# Patient Record
Sex: FEMALE | Race: WHITE | NOT HISPANIC OR LATINO | Employment: FULL TIME | URBAN - METROPOLITAN AREA
[De-identification: names, ages, dates, MRNs, and addresses within clinical notes are randomized per-mention and may not be internally consistent; named-entity substitution may affect disease eponyms.]

---

## 2024-09-10 ENCOUNTER — VIRTUAL VISIT (OUTPATIENT)
Dept: NEUROSURGERY | Facility: CLINIC | Age: 44
End: 2024-09-10
Payer: COMMERCIAL

## 2024-09-10 ENCOUNTER — PRE VISIT (OUTPATIENT)
Dept: NEUROSURGERY | Facility: CLINIC | Age: 44
End: 2024-09-10

## 2024-09-10 VITALS — BODY MASS INDEX: 24.49 KG/M2 | HEIGHT: 65 IN | WEIGHT: 147 LBS

## 2024-09-10 DIAGNOSIS — D32.0 BENIGN NEOPLASM OF CEREBRAL MENINGES (H): Primary | ICD-10-CM

## 2024-09-10 PROCEDURE — 99204 OFFICE O/P NEW MOD 45 MIN: CPT | Mod: 95 | Performed by: NEUROLOGICAL SURGERY

## 2024-09-10 RX ORDER — LEVETIRACETAM 1000 MG/1
1000 TABLET ORAL 2 TIMES DAILY
COMMUNITY
Start: 2024-09-10

## 2024-09-10 RX ORDER — DEXAMETHASONE 4 MG/1
4 TABLET ORAL 3 TIMES DAILY
Status: ON HOLD | COMMUNITY
Start: 2024-09-10 | End: 2024-09-18

## 2024-09-10 RX ORDER — OMEPRAZOLE 40 MG/1
40 CAPSULE, DELAYED RELEASE ORAL DAILY
COMMUNITY
Start: 2024-09-10

## 2024-09-10 ASSESSMENT — PAIN SCALES - GENERAL: PAINLEVEL: NO PAIN (0)

## 2024-09-10 NOTE — PROGRESS NOTES
"Virtual Visit Details  Date of service: 9/10/2024  Length of service: 45 minutes  Type of service:  Telephone Visit     Originating Location (pt. Location): Home  Distant Location (provider location):  On-site      Shelbi Bermudez MD   [address] [place] [id number]    Dear Dr. Bermudez:    We spoke to Mrs. Wilson as part of a telephone visit in cranial neurosurgery clinic today.  We discussed her case extensively with our colleague, Dr. Melvin, and reviewed her records from [facility].  She was accompanied by her fiancé.  In summary, she is a 44-year-old right-handed woman who experienced a first time seizure yesterday.  Below is the excerpt from the emergency department evaluation:    \"Lanette Kaufman is a 44-year-old woman who comes in for evaluation because of concern for seizure today. She states that she is been feeling well recently. She has not been have any headaches or nausea or vomiting or visual changes. She has not noticed any weakness. She does not really recall all of the events of today, but remembers being on the phone with her grandma. She states that she recalls having trouble understanding her grandmother, but on further discussion it sounds like she understood her grandmother was saying but could not get her words out. She was having trouble understanding how to respond to things.    Boyfriend tells me that she was having some trouble communicating with him briefly as well around this same time. He went out to the garage briefly and came back in less than 5 minutes later and saw her on the sofa. She was not actively shaking, but he noted that her muscle tone was tense and she was not breathing normally. She was noted to be foaming at the mouth. She did not urinate on herself. He rolled her over on her side and this lasted a few minutes before completely resolving. It was on the order of 2 or 3 minutes before she was opening her eyes, and less than 10 minutes before " "EMS got the call and arrived, at which point she was able to verbalize her boyfriend's name when he asked.\"    She felt back to normal by yesterday afternoon.  She had some headaches over the weekend and she has a longstanding history of more severe headaches which she labeled as migraines.  She was started on dexamethasone 4 mg 3 times daily and Keppra 1000 mg twice daily and omeprazole.  She denies any problems with speech, language, memory, or performance at work.    Her past medical history includes appendectomy in ,  x 2, lymph node biopsy in , and uterine fibroids.    Family history includes breast cancer and stroke in her mother and leukemia in her maternal grandfather    She lives in [place], [place].  She is engaged and has 2 daughters.  She works as a  at [employer].  She does not smoke and drinks alcohol socially several times a week.    Over the phone, she sounded well.  Her speech, language, and phonation were normal.    We had limited views of the MRI done yesterday, 9/10/2024.  There is a moderate-sized (2.5 cm) homogeneously enhancing tumor arising from the anterior fossa over the left orbital roof and projecting into the left basal frontal lobe.  There is marked edema throughout the left prefrontal region.  Imaging characteristics are consistent with meningioma.    Body CT scanning was also done and no masses other than the uterine fibroids were seen.    We agree with Dr. Melvin that resection of this tumor is only reasonable treatment option.  Since she is feeling well currently, surgery can be done electively, within the next 1 to 2 weeks.  Since she is on a moderate dose of dexamethasone, we agree that surgery should not be delayed for a long time.  We will obtain the remainder of her imaging studies.    Ms. Kaufman was already prepared for surgery.  We reviewed the details of the left frontal craniotomy and tumor resection including the risks of death, " brain injury, infection, hemorrhage, stroke, CSF leak, need for reoperation and she agrees to proceed.  We also went over the expected hospital course and postoperative recovery.  We will plan on surgery sometime in the next 1 to 2 weeks.  From a social standpoint, the sooner she recovers from surgery, the sooner she will be ready for her wedding in January.  We will keep you informed of her progress.  Please do not hesitate to contact us with questions.    Sincerely,        Valentin Desai MD  Department of Neurosurgery

## 2024-09-10 NOTE — LETTER
"9/10/2024       RE: Lanette Wilson  [address]  [facility] [address]     Dear Colleague,    Thank you for referring your patient, Lanette Wilson, to the [facility] NEUROSURGERY [facility] [place] at [facility]. Please see a copy of my visit note below.    Virtual Visit Details  Date of service: 9/10/2024  Length of service: 45 minutes  Type of service:  Telephone Visit     Originating Location (pt. Location): Home  Distant Location (provider location):  On-site      Shelbi Bermudez MD   [address] [place] [id number]    Dear Dr. Bermudez:    We spoke to Mrs. Wilson as part of a telephone visit in cranial neurosurgery clinic today.  We discussed her case extensively with our colleague, Dr. Melvin, and reviewed her records from [facility].  She was accompanied by her fiancé.  In summary, she is a 44-year-old right-handed woman who experienced a first time seizure yesterday.  Below is the excerpt from the emergency department evaluation:    \"Lanette Kaufman is a 44-year-old woman who comes in for evaluation because of concern for seizure today. She states that she is been feeling well recently. She has not been have any headaches or nausea or vomiting or visual changes. She has not noticed any weakness. She does not really recall all of the events of today, but remembers being on the phone with her grandma. She states that she recalls having trouble understanding her grandmother, but on further discussion it sounds like she understood her grandmother was saying but could not get her words out. She was having trouble understanding how to respond to things.    Boyfriend tells me that she was having some trouble communicating with him briefly as well around this same time. He went out to the garage briefly and came back in less than 5 minutes later and saw her on the sofa. She was not actively shaking, but he noted that her " "muscle tone was tense and she was not breathing normally. She was noted to be foaming at the mouth. She did not urinate on herself. He rolled her over on her side and this lasted a few minutes before completely resolving. It was on the order of 2 or 3 minutes before she was opening her eyes, and less than 10 minutes before EMS got the call and arrived, at which point she was able to verbalize her boyfriend's name when he asked.\"    She felt back to normal by yesterday afternoon.  She had some headaches over the weekend and she has a longstanding history of more severe headaches which she labeled as migraines.  She was started on dexamethasone 4 mg 3 times daily and Keppra 1000 mg twice daily and omeprazole.  She denies any problems with speech, language, memory, or performance at work.    Her past medical history includes appendectomy in ,  x 2, lymph node biopsy in , and uterine fibroids.    Family history includes breast cancer and stroke in her mother and leukemia in her maternal grandfather    She lives in [place], [place].  She is engaged and has 2 daughters.  She works as a  at [email or web address].  She does not smoke and drinks alcohol socially several times a week.    Over the phone, she sounded well.  Her speech, language, and phonation were normal.    We had limited views of the MRI done yesterday, 9/10/2024.  There is a moderate-sized (2.5 cm) homogeneously enhancing tumor arising from the anterior fossa over the left orbital roof and projecting into the left basal frontal lobe.  There is marked edema throughout the left prefrontal region.  Imaging characteristics are consistent with meningioma.    Body CT scanning was also done and no masses other than the uterine fibroids were seen.    We agree with Dr. Melvin that resection of this tumor is only reasonable treatment option.  Since she is feeling well currently, surgery can be done electively, within the next 1 to 2 weeks.  " Since she is on a moderate dose of dexamethasone, we agree that surgery should not be delayed for a long time.  We will obtain the remainder of her imaging studies.    Ms. Kaufman was already prepared for surgery.  We reviewed the details of the left frontal craniotomy and tumor resection including the risks of death, brain injury, infection, hemorrhage, stroke, CSF leak, need for reoperation and she agrees to proceed.  We also went over the expected hospital course and postoperative recovery.  We will plan on surgery sometime in the next 1 to 2 weeks.  From a social standpoint, the sooner she recovers from surgery, the sooner she will be ready for her wedding in January.  We will keep you informed of her progress.  Please do not hesitate to contact us with questions.    Sincerely,        Valentin Desai MD  Department of Neurosurgery            Again, thank you for allowing me to participate in the care of your patient.      Sincerely,    Valnetin Desai MD

## 2024-09-10 NOTE — TELEPHONE ENCOUNTER
** Marked for Merge w/ MRN [id number]  Lanette Carter     Records Requested     September 10, 2024 3:46 PM   [id number]   Facility  [facility]   Outcome 3:49 pm Sent request for imaging to be pushed to PACS.-JA     RECORDS RECEIVED FROM: Care Everywhere   REASON FOR VISIT: Brain Mass   PROVIDER: Valentin Desai MD   DATE OF APPT: 9/10/24 @ 3:30 pm    NOTES (FOR ALL VISITS) STATUS DETAILS   DISCHARGE REPORT from the ER Care Everywhere 9/9/24-9/10/24 Faiza Escobedo MD  @[facility] ED     MEDICATION LIST Care Everywhere    IMAGING  (FOR ALL VISITS)     MRI (HEAD, NECK, SPINE) [facility]  9/10/24 MR Brain

## 2024-09-10 NOTE — NURSING NOTE
Current patient location: [address] [place] [id number]    Is the patient currently in the state of [place]? NO    Visit mode:VIDEO    If the visit is dropped, the patient can be reconnected by: VIDEO VISIT: Text to cell phone:   No relevant phone numbers on file.       Will anyone else be joining the visit? NO  (If patient encounters technical issues they should call [phone])    How would you like to obtain your AVS? Mail a copy    Are changes needed to the allergy or medication list? No    Are refills needed on medications prescribed by this physician? NO    Rooming Documentation:  Not applicable      Reason for visit: Consult    Melanie WAITE

## 2024-09-13 ENCOUNTER — TELEPHONE (OUTPATIENT)
Dept: NEUROSURGERY | Facility: CLINIC | Age: 44
End: 2024-09-13
Payer: COMMERCIAL

## 2024-09-13 RX ORDER — CEFAZOLIN SODIUM/WATER 2 G/20 ML
2 SYRINGE (ML) INTRAVENOUS
Status: CANCELLED | OUTPATIENT
Start: 2024-09-13

## 2024-09-13 RX ORDER — CEFAZOLIN SODIUM/WATER 2 G/20 ML
2 SYRINGE (ML) INTRAVENOUS SEE ADMIN INSTRUCTIONS
Status: CANCELLED | OUTPATIENT
Start: 2024-09-13

## 2024-09-13 NOTE — TELEPHONE ENCOUNTER
Spoke with patient, states pre admission has just talked with patient reviewing date/time location of procedure, showering information and when to stop eating/drinking fluids.  Patient states all questions answered ready for Procedure Arrival time 12:00 PM Monday 9/16 @ [address] [address].    Voices understanding and has my name and number if needed.

## 2024-09-13 NOTE — TELEPHONE ENCOUNTER
Called patient to schedule surgery with Dr. Freddy Desai    Spoke with:  Lanette (patient)    Date of Surgery: 9/16/2024    Estimated Arrival time Discussed with Patient:  Yes    Location of surgery: [facility]     Pre-Op H&P: Completed with ED visit on 9/9    Post-Op Appts: 10/4/2024 with Beverly CARRILLO     Imaging:  No     Discussed with patient pre-op RN will call 2-3 days prior to surgery with arrival time and instructions:  Yes     Packet sent out: No 09/13/24  via [employer]    Vendor/Rep/Monitoring:   No     Additional Comments:      All patients questions were answered and patient was instructed to review surgical packet and call back with any questions or concerns.       Leanne Carbone on 9/13/2024 at 1:43 PM

## 2024-09-15 ENCOUNTER — ANESTHESIA EVENT (OUTPATIENT)
Dept: SURGERY | Facility: CLINIC | Age: 44
End: 2024-09-15
Payer: COMMERCIAL

## 2024-09-15 NOTE — ANESTHESIA PREPROCEDURE EVALUATION
"Anesthesia Pre-Procedure Evaluation    Patient: Lanette Wilson   MRN: [id number] : 1980        Procedure : Procedure(s):  Left frontal craniotomy and resection of tumor          No past medical history on file.   No past surgical history on file.   Allergies   Allergen Reactions    Cats      Sneezing and runny nose    Dogs      Runny nose and watery eye      Social History     Tobacco Use    Smoking status: Never    Smokeless tobacco: Never   Substance Use Topics    Alcohol use: Not on file     Comment: 3 drinks a day 21 a week      Wt Readings from Last 1 Encounters:   09/10/24 66.7 kg (147 lb)        Anesthesia Evaluation   Pt has had prior anesthetic. Type: General and Regional.    No history of anesthetic complications       ROS/MED HX  ENT/Pulmonary:  - neg pulmonary ROS     Neurologic: Comment: Meningioma, discovered after a seizure. On decadron 4mg TID and keppra 1000mg BID    (+)       seizures,                         Cardiovascular:  - neg cardiovascular ROS     METS/Exercise Tolerance:     Hematologic:  - neg hematologic  ROS     Musculoskeletal:  - neg musculoskeletal ROS     GI/Hepatic:  - neg GI/hepatic ROS  (-) GERD   Renal/Genitourinary:  - neg Renal ROS     Endo:  - neg endo ROS     Psychiatric/Substance Use:  - neg psychiatric ROS     Infectious Disease:  - neg infectious disease ROS     Malignancy:       Other:            Physical Exam    Airway        Mallampati: III   TM distance: > 3 FB   Neck ROM: full   Mouth opening: < 3 cm    Respiratory Devices and Support         Dental       (+) Minor Abnormalities - some fillings, tiny chips      Cardiovascular   cardiovascular exam normal          Pulmonary   pulmonary exam normal                OUTSIDE LABS:  CBC: No results found for: \"WBC\", \"HGB\", \"HCT\", \"PLT\"  BMP: No results found for: \"NA\", \"POTASSIUM\", \"CHLORIDE\", \"CO2\", \"BUN\", \"CR\", \"GLC\"  COAGS: No results found for: \"PTT\", \"INR\", \"FIBR\"  POC: No results found for: \"BGM\", \"HCG\", " "\"HCGS\"  HEPATIC: No results found for: \"ALBUMIN\", \"PROTTOTAL\", \"ALT\", \"AST\", \"GGT\", \"ALKPHOS\", \"BILITOTAL\", \"BILIDIRECT\", \"JORGE\"  OTHER: No results found for: \"PH\", \"LACT\", \"A1C\", \"CHI\", \"PHOS\", \"MAG\", \"LIPASE\", \"AMYLASE\", \"TSH\", \"T4\", \"T3\", \"CRP\", \"SED\"    Anesthesia Plan    ASA Status:  2    NPO Status:  NPO Appropriate    Anesthesia Type: General.     - Airway: ETT   Induction: Intravenous, Propofol.   Maintenance: Balanced.   Techniques and Equipment:     - Lines/Monitors: 2nd IV, Arterial Line     Consents    Anesthesia Plan(s) and associated risks, benefits, and realistic alternatives discussed. Questions answered and patient/representative(s) expressed understanding.     - Discussed:     - Discussed with:  Patient      - Extended Intubation/Ventilatory Support Discussed: No.      - Patient is DNR/DNI Status: No     Use of blood products discussed: Yes.     - Discussed with: Patient.     - Consented: consented to blood products     Postoperative Care    Pain management: Multi-modal analgesia, IV analgesics.   PONV prophylaxis: Ondansetron (or other 5HT-3), Dexamethasone or Solumedrol     Comments:               Hilario Ovalle MD    I have reviewed the pertinent notes and labs in the chart from the past 30 days and (re)examined the patient.  Any updates or changes from those notes are reflected in this note.                  "

## 2024-09-16 ENCOUNTER — ANESTHESIA (OUTPATIENT)
Dept: SURGERY | Facility: CLINIC | Age: 44
End: 2024-09-16
Payer: COMMERCIAL

## 2024-09-16 ENCOUNTER — APPOINTMENT (OUTPATIENT)
Dept: CT IMAGING | Facility: CLINIC | Age: 44
End: 2024-09-16
Payer: COMMERCIAL

## 2024-09-16 ENCOUNTER — HOSPITAL ENCOUNTER (INPATIENT)
Facility: CLINIC | Age: 44
LOS: 2 days | Discharge: HOME | End: 2024-09-18
Attending: NEUROLOGICAL SURGERY | Admitting: NEUROLOGICAL SURGERY
Payer: COMMERCIAL

## 2024-09-16 DIAGNOSIS — G93.89 FRONTAL MASS OF BRAIN: Primary | ICD-10-CM

## 2024-09-16 LAB
ABO/RH(D): NORMAL
ANION GAP SERPL CALCULATED.3IONS-SCNC: 14 MMOL/L (ref 7–15)
ANTIBODY SCREEN: NEGATIVE
APTT PPP: <20 SECONDS (ref 22–38)
BASE EXCESS BLDA CALC-SCNC: 1.3 MMOL/L (ref -3–3)
BASE EXCESS BLDA CALC-SCNC: 1.8 MMOL/L (ref -3–3)
BASE EXCESS BLDA CALC-SCNC: 2 MMOL/L (ref -3–3)
BUN SERPL-MCNC: 15.2 MG/DL (ref 6–20)
CA-I BLD-MCNC: 4.4 MG/DL (ref 4.4–5.2)
CA-I BLD-MCNC: 4.5 MG/DL (ref 4.4–5.2)
CA-I BLD-MCNC: 4.5 MG/DL (ref 4.4–5.2)
CALCIUM SERPL-MCNC: 9.4 MG/DL (ref 8.8–10.4)
CHLORIDE SERPL-SCNC: 99 MMOL/L (ref 98–107)
CREAT SERPL-MCNC: 0.62 MG/DL (ref 0.51–0.95)
EGFRCR SERPLBLD CKD-EPI 2021: >90 ML/MIN/1.73M2
ERYTHROCYTE [DISTWIDTH] IN BLOOD BY AUTOMATED COUNT: 14.1 % (ref 10–15)
GLUCOSE BLD-MCNC: 125 MG/DL (ref 70–99)
GLUCOSE BLD-MCNC: 126 MG/DL (ref 70–99)
GLUCOSE BLD-MCNC: 165 MG/DL (ref 70–99)
GLUCOSE BLDC GLUCOMTR-MCNC: 127 MG/DL (ref 70–99)
GLUCOSE BLDC GLUCOMTR-MCNC: 130 MG/DL (ref 70–99)
GLUCOSE SERPL-MCNC: 109 MG/DL (ref 70–99)
HCO3 BLDA-SCNC: 25 MMOL/L (ref 21–28)
HCO3 BLDA-SCNC: 25 MMOL/L (ref 21–28)
HCO3 BLDA-SCNC: 26 MMOL/L (ref 21–28)
HCO3 SERPL-SCNC: 24 MMOL/L (ref 22–29)
HCT VFR BLD AUTO: 40.8 % (ref 35–47)
HGB BLD-MCNC: 11.4 G/DL (ref 11.7–15.7)
HGB BLD-MCNC: 11.8 G/DL (ref 11.7–15.7)
HGB BLD-MCNC: 12.2 G/DL (ref 11.7–15.7)
HGB BLD-MCNC: 13.2 G/DL (ref 11.7–15.7)
INR PPP: 0.97 (ref 0.85–1.15)
LACTATE BLD-SCNC: 2.2 MMOL/L (ref 0.7–2)
LACTATE BLD-SCNC: 3.6 MMOL/L (ref 0.7–2)
LACTATE BLD-SCNC: 4.5 MMOL/L (ref 0.7–2)
MCH RBC QN AUTO: 26.4 PG (ref 26.5–33)
MCHC RBC AUTO-ENTMCNC: 32.4 G/DL (ref 31.5–36.5)
MCV RBC AUTO: 82 FL (ref 78–100)
O2/TOTAL GAS SETTING VFR VENT: 50 %
OXYHGB MFR BLDA: 98 % (ref 92–100)
OXYHGB MFR BLDA: 99 % (ref 92–100)
OXYHGB MFR BLDA: 99 % (ref 92–100)
PCO2 BLDA: 33 MM HG (ref 35–45)
PCO2 BLDA: 35 MM HG (ref 35–45)
PCO2 BLDA: 37 MM HG (ref 35–45)
PH BLDA: 7.46 [PH] (ref 7.35–7.45)
PH BLDA: 7.46 [PH] (ref 7.35–7.45)
PH BLDA: 7.49 [PH] (ref 7.35–7.45)
PLATELET # BLD AUTO: 216 10E3/UL (ref 150–450)
PO2 BLDA: 171 MM HG (ref 80–105)
PO2 BLDA: 193 MM HG (ref 80–105)
PO2 BLDA: 203 MM HG (ref 80–105)
POTASSIUM BLD-SCNC: 3.2 MMOL/L (ref 3.4–5.3)
POTASSIUM BLD-SCNC: 3.7 MMOL/L (ref 3.4–5.3)
POTASSIUM BLD-SCNC: 3.7 MMOL/L (ref 3.4–5.3)
POTASSIUM SERPL-SCNC: 3.5 MMOL/L (ref 3.4–5.3)
RBC # BLD AUTO: 5 10E6/UL (ref 3.8–5.2)
SAO2 % BLDA: 100 % (ref 96–97)
SODIUM BLD-SCNC: 133 MMOL/L (ref 135–145)
SODIUM BLD-SCNC: 135 MMOL/L (ref 135–145)
SODIUM BLD-SCNC: 137 MMOL/L (ref 135–145)
SODIUM SERPL-SCNC: 137 MMOL/L (ref 135–145)
SPECIMEN EXPIRATION DATE: NORMAL
WBC # BLD AUTO: 11.1 10E3/UL (ref 4–11)

## 2024-09-16 PROCEDURE — 88342 IMHCHEM/IMCYTCHM 1ST ANTB: CPT | Mod: 26 | Performed by: STUDENT IN AN ORGANIZED HEALTH CARE EDUCATION/TRAINING PROGRAM

## 2024-09-16 PROCEDURE — 69990 MICROSURGERY ADD-ON: CPT | Mod: GC | Performed by: NEUROLOGICAL SURGERY

## 2024-09-16 PROCEDURE — 84295 ASSAY OF SERUM SODIUM: CPT

## 2024-09-16 PROCEDURE — 61584 ORBITOCRANIAL APPROACH/SKULL: CPT | Mod: GC | Performed by: NEUROLOGICAL SURGERY

## 2024-09-16 PROCEDURE — 85730 THROMBOPLASTIN TIME PARTIAL: CPT | Performed by: STUDENT IN AN ORGANIZED HEALTH CARE EDUCATION/TRAINING PROGRAM

## 2024-09-16 PROCEDURE — 200N000002 HC R&B ICU UMMC

## 2024-09-16 PROCEDURE — 250N000011 HC RX IP 250 OP 636

## 2024-09-16 PROCEDURE — 250N000011 HC RX IP 250 OP 636: Performed by: ANESTHESIOLOGY

## 2024-09-16 PROCEDURE — 250N000009 HC RX 250: Performed by: ANESTHESIOLOGY

## 2024-09-16 PROCEDURE — 80048 BASIC METABOLIC PNL TOTAL CA: CPT | Performed by: STUDENT IN AN ORGANIZED HEALTH CARE EDUCATION/TRAINING PROGRAM

## 2024-09-16 PROCEDURE — 61510 CRNEC TREPH EXC BRN TUM STTL: CPT | Performed by: ANESTHESIOLOGY

## 2024-09-16 PROCEDURE — 61510 CRNEC TREPH EXC BRN TUM STTL: CPT | Performed by: NURSE ANESTHETIST, CERTIFIED REGISTERED

## 2024-09-16 PROCEDURE — 36415 COLL VENOUS BLD VENIPUNCTURE: CPT | Performed by: STUDENT IN AN ORGANIZED HEALTH CARE EDUCATION/TRAINING PROGRAM

## 2024-09-16 PROCEDURE — 250N000025 HC SEVOFLURANE, PER MIN: Performed by: NEUROLOGICAL SURGERY

## 2024-09-16 PROCEDURE — 710N000010 HC RECOVERY PHASE 1, LEVEL 2, PER MIN: Performed by: NEUROLOGICAL SURGERY

## 2024-09-16 PROCEDURE — 70450 CT HEAD/BRAIN W/O DYE: CPT | Mod: 26 | Performed by: STUDENT IN AN ORGANIZED HEALTH CARE EDUCATION/TRAINING PROGRAM

## 2024-09-16 PROCEDURE — 250N000009 HC RX 250: Performed by: NURSE ANESTHETIST, CERTIFIED REGISTERED

## 2024-09-16 PROCEDURE — 250N000011 HC RX IP 250 OP 636: Performed by: NURSE ANESTHETIST, CERTIFIED REGISTERED

## 2024-09-16 PROCEDURE — 86900 BLOOD TYPING SEROLOGIC ABO: CPT | Performed by: STUDENT IN AN ORGANIZED HEALTH CARE EDUCATION/TRAINING PROGRAM

## 2024-09-16 PROCEDURE — 88341 IMHCHEM/IMCYTCHM EA ADD ANTB: CPT | Mod: 26 | Performed by: STUDENT IN AN ORGANIZED HEALTH CARE EDUCATION/TRAINING PROGRAM

## 2024-09-16 PROCEDURE — 999N000141 HC STATISTIC PRE-PROCEDURE NURSING ASSESSMENT: Performed by: NEUROLOGICAL SURGERY

## 2024-09-16 PROCEDURE — 250N000013 HC RX MED GY IP 250 OP 250 PS 637

## 2024-09-16 PROCEDURE — 250N000011 HC RX IP 250 OP 636: Performed by: STUDENT IN AN ORGANIZED HEALTH CARE EDUCATION/TRAINING PROGRAM

## 2024-09-16 PROCEDURE — 70450 CT HEAD/BRAIN W/O DYE: CPT

## 2024-09-16 PROCEDURE — 85610 PROTHROMBIN TIME: CPT | Performed by: STUDENT IN AN ORGANIZED HEALTH CARE EDUCATION/TRAINING PROGRAM

## 2024-09-16 PROCEDURE — 00D70ZZ EXTRACTION OF CEREBRAL HEMISPHERE, OPEN APPROACH: ICD-10-PCS | Performed by: NEUROLOGICAL SURGERY

## 2024-09-16 PROCEDURE — 360N000079 HC SURGERY LEVEL 6, PER MIN: Performed by: NEUROLOGICAL SURGERY

## 2024-09-16 PROCEDURE — 61601 RESECT/EXCISE CRANIAL LESION: CPT | Mod: 51 | Performed by: NEUROLOGICAL SURGERY

## 2024-09-16 PROCEDURE — 00B10ZZ EXCISION OF CEREBRAL MENINGES, OPEN APPROACH: ICD-10-PCS | Performed by: NEUROLOGICAL SURGERY

## 2024-09-16 PROCEDURE — 88342 IMHCHEM/IMCYTCHM 1ST ANTB: CPT | Mod: TC | Performed by: NEUROLOGICAL SURGERY

## 2024-09-16 PROCEDURE — 370N000017 HC ANESTHESIA TECHNICAL FEE, PER MIN: Performed by: NEUROLOGICAL SURGERY

## 2024-09-16 PROCEDURE — 258N000003 HC RX IP 258 OP 636: Performed by: STUDENT IN AN ORGANIZED HEALTH CARE EDUCATION/TRAINING PROGRAM

## 2024-09-16 PROCEDURE — 250N000013 HC RX MED GY IP 250 OP 250 PS 637: Performed by: STUDENT IN AN ORGANIZED HEALTH CARE EDUCATION/TRAINING PROGRAM

## 2024-09-16 PROCEDURE — 250N000009 HC RX 250: Performed by: NEUROLOGICAL SURGERY

## 2024-09-16 PROCEDURE — 82330 ASSAY OF CALCIUM: CPT

## 2024-09-16 PROCEDURE — C1763 CONN TISS, NON-HUMAN: HCPCS | Performed by: NEUROLOGICAL SURGERY

## 2024-09-16 PROCEDURE — 258N000003 HC RX IP 258 OP 636: Performed by: NURSE ANESTHETIST, CERTIFIED REGISTERED

## 2024-09-16 PROCEDURE — 272N000001 HC OR GENERAL SUPPLY STERILE: Performed by: NEUROLOGICAL SURGERY

## 2024-09-16 PROCEDURE — 250N000011 HC RX IP 250 OP 636: Performed by: NEUROLOGICAL SURGERY

## 2024-09-16 PROCEDURE — 88307 TISSUE EXAM BY PATHOLOGIST: CPT | Mod: 26 | Performed by: STUDENT IN AN ORGANIZED HEALTH CARE EDUCATION/TRAINING PROGRAM

## 2024-09-16 PROCEDURE — C1713 ANCHOR/SCREW BN/BN,TIS/BN: HCPCS | Performed by: NEUROLOGICAL SURGERY

## 2024-09-16 PROCEDURE — 258N000003 HC RX IP 258 OP 636

## 2024-09-16 PROCEDURE — 85014 HEMATOCRIT: CPT | Performed by: STUDENT IN AN ORGANIZED HEALTH CARE EDUCATION/TRAINING PROGRAM

## 2024-09-16 DEVICE — IMP PLATE SYN BURR HOLE COVER 17MM 04.503.023: Type: IMPLANTABLE DEVICE | Site: CRANIAL | Status: FUNCTIONAL

## 2024-09-16 DEVICE — IMP PLATE SYN BOX 4 HOLE 14X14MM 04.503.065: Type: IMPLANTABLE DEVICE | Site: CRANIAL | Status: FUNCTIONAL

## 2024-09-16 DEVICE — GRAFT DURAGEN 2X2" ID220: Type: IMPLANTABLE DEVICE | Site: CRANIAL | Status: FUNCTIONAL

## 2024-09-16 DEVICE — IMP PLATE SYN MATRIXNEURO STR 2 HOLE 12MM  04.503.062: Type: IMPLANTABLE DEVICE | Site: CRANIAL | Status: FUNCTIONAL

## 2024-09-16 RX ORDER — NALOXONE HYDROCHLORIDE 0.4 MG/ML
0.1 INJECTION, SOLUTION INTRAMUSCULAR; INTRAVENOUS; SUBCUTANEOUS
Status: DISCONTINUED | OUTPATIENT
Start: 2024-09-16 | End: 2024-09-16 | Stop reason: HOSPADM

## 2024-09-16 RX ORDER — ONDANSETRON 4 MG/1
4 TABLET, ORALLY DISINTEGRATING ORAL EVERY 6 HOURS PRN
Status: DISCONTINUED | OUTPATIENT
Start: 2024-09-16 | End: 2024-09-18 | Stop reason: HOSPADM

## 2024-09-16 RX ORDER — LABETALOL HYDROCHLORIDE 5 MG/ML
10 INJECTION, SOLUTION INTRAVENOUS
Status: DISCONTINUED | OUTPATIENT
Start: 2024-09-16 | End: 2024-09-16 | Stop reason: HOSPADM

## 2024-09-16 RX ORDER — DEXAMETHASONE 4 MG/1
4 TABLET ORAL EVERY 8 HOURS SCHEDULED
Status: CANCELLED | OUTPATIENT
Start: 2024-09-16

## 2024-09-16 RX ORDER — ACETAMINOPHEN 325 MG/1
975 TABLET ORAL ONCE
Status: COMPLETED | OUTPATIENT
Start: 2024-09-16 | End: 2024-09-16

## 2024-09-16 RX ORDER — PROPOFOL 10 MG/ML
INJECTION, EMULSION INTRAVENOUS PRN
Status: DISCONTINUED | OUTPATIENT
Start: 2024-09-16 | End: 2024-09-16

## 2024-09-16 RX ORDER — ONDANSETRON 2 MG/ML
INJECTION INTRAMUSCULAR; INTRAVENOUS PRN
Status: DISCONTINUED | OUTPATIENT
Start: 2024-09-16 | End: 2024-09-16

## 2024-09-16 RX ORDER — SODIUM CHLORIDE, SODIUM LACTATE, POTASSIUM CHLORIDE, CALCIUM CHLORIDE 600; 310; 30; 20 MG/100ML; MG/100ML; MG/100ML; MG/100ML
INJECTION, SOLUTION INTRAVENOUS CONTINUOUS
Status: DISCONTINUED | OUTPATIENT
Start: 2024-09-16 | End: 2024-09-16 | Stop reason: HOSPADM

## 2024-09-16 RX ORDER — SODIUM CHLORIDE 9 MG/ML
INJECTION, SOLUTION INTRAVENOUS CONTINUOUS
Status: ACTIVE | OUTPATIENT
Start: 2024-09-16 | End: 2024-09-17

## 2024-09-16 RX ORDER — LIDOCAINE HYDROCHLORIDE 20 MG/ML
INJECTION, SOLUTION INFILTRATION; PERINEURAL PRN
Status: DISCONTINUED | OUTPATIENT
Start: 2024-09-16 | End: 2024-09-16

## 2024-09-16 RX ORDER — NALOXONE HYDROCHLORIDE 0.4 MG/ML
0.2 INJECTION, SOLUTION INTRAMUSCULAR; INTRAVENOUS; SUBCUTANEOUS
Status: DISCONTINUED | OUTPATIENT
Start: 2024-09-16 | End: 2024-09-18 | Stop reason: HOSPADM

## 2024-09-16 RX ORDER — POLYETHYLENE GLYCOL 3350 17 G/17G
17 POWDER, FOR SOLUTION ORAL DAILY
Status: DISCONTINUED | OUTPATIENT
Start: 2024-09-17 | End: 2024-09-18

## 2024-09-16 RX ORDER — LEVETIRACETAM 1000 MG/1
1000 TABLET ORAL 2 TIMES DAILY
Status: CANCELLED | OUTPATIENT
Start: 2024-09-16

## 2024-09-16 RX ORDER — OXYCODONE HYDROCHLORIDE 5 MG/1
5 TABLET ORAL EVERY 4 HOURS PRN
Status: DISCONTINUED | OUTPATIENT
Start: 2024-09-16 | End: 2024-09-18 | Stop reason: HOSPADM

## 2024-09-16 RX ORDER — HYDRALAZINE HYDROCHLORIDE 20 MG/ML
10-20 INJECTION INTRAMUSCULAR; INTRAVENOUS EVERY 30 MIN PRN
Status: DISCONTINUED | OUTPATIENT
Start: 2024-09-16 | End: 2024-09-18 | Stop reason: HOSPADM

## 2024-09-16 RX ORDER — MANNITOL 20 G/100ML
INJECTION, SOLUTION INTRAVENOUS PRN
Status: DISCONTINUED | OUTPATIENT
Start: 2024-09-16 | End: 2024-09-16

## 2024-09-16 RX ORDER — HYDROMORPHONE HYDROCHLORIDE 1 MG/ML
0.4 INJECTION, SOLUTION INTRAMUSCULAR; INTRAVENOUS; SUBCUTANEOUS
Status: DISCONTINUED | OUTPATIENT
Start: 2024-09-16 | End: 2024-09-18 | Stop reason: HOSPADM

## 2024-09-16 RX ORDER — CEFAZOLIN SODIUM/WATER 2 G/20 ML
2 SYRINGE (ML) INTRAVENOUS SEE ADMIN INSTRUCTIONS
Status: DISCONTINUED | OUTPATIENT
Start: 2024-09-16 | End: 2024-09-16 | Stop reason: HOSPADM

## 2024-09-16 RX ORDER — DEXAMETHASONE SODIUM PHOSPHATE 4 MG/ML
4 INJECTION, SOLUTION INTRA-ARTICULAR; INTRALESIONAL; INTRAMUSCULAR; INTRAVENOUS; SOFT TISSUE
Status: DISCONTINUED | OUTPATIENT
Start: 2024-09-16 | End: 2024-09-16 | Stop reason: HOSPADM

## 2024-09-16 RX ORDER — NITROGLYCERIN 10 MG/100ML
INJECTION INTRAVENOUS PRN
Status: DISCONTINUED | OUTPATIENT
Start: 2024-09-16 | End: 2024-09-16

## 2024-09-16 RX ORDER — LEVETIRACETAM 15 MG/ML
1500 INJECTION INTRAVASCULAR ONCE
Status: COMPLETED | OUTPATIENT
Start: 2024-09-16 | End: 2024-09-16

## 2024-09-16 RX ORDER — CEFAZOLIN SODIUM/WATER 2 G/20 ML
2 SYRINGE (ML) INTRAVENOUS
Status: COMPLETED | OUTPATIENT
Start: 2024-09-16 | End: 2024-09-16

## 2024-09-16 RX ORDER — NALOXONE HYDROCHLORIDE 0.4 MG/ML
0.4 INJECTION, SOLUTION INTRAMUSCULAR; INTRAVENOUS; SUBCUTANEOUS
Status: DISCONTINUED | OUTPATIENT
Start: 2024-09-16 | End: 2024-09-18 | Stop reason: HOSPADM

## 2024-09-16 RX ORDER — FENTANYL CITRATE 50 UG/ML
25 INJECTION, SOLUTION INTRAMUSCULAR; INTRAVENOUS EVERY 5 MIN PRN
Status: DISCONTINUED | OUTPATIENT
Start: 2024-09-16 | End: 2024-09-16 | Stop reason: HOSPADM

## 2024-09-16 RX ORDER — FENTANYL CITRATE 50 UG/ML
50 INJECTION, SOLUTION INTRAMUSCULAR; INTRAVENOUS EVERY 5 MIN PRN
Status: DISCONTINUED | OUTPATIENT
Start: 2024-09-16 | End: 2024-09-16 | Stop reason: HOSPADM

## 2024-09-16 RX ORDER — PROPOFOL 10 MG/ML
INJECTION, EMULSION INTRAVENOUS CONTINUOUS PRN
Status: DISCONTINUED | OUTPATIENT
Start: 2024-09-16 | End: 2024-09-16

## 2024-09-16 RX ORDER — BISACODYL 10 MG
10 SUPPOSITORY, RECTAL RECTAL DAILY PRN
Status: DISCONTINUED | OUTPATIENT
Start: 2024-09-16 | End: 2024-09-18 | Stop reason: HOSPADM

## 2024-09-16 RX ORDER — PROCHLORPERAZINE MALEATE 10 MG
10 TABLET ORAL EVERY 6 HOURS PRN
Status: DISCONTINUED | OUTPATIENT
Start: 2024-09-16 | End: 2024-09-18 | Stop reason: HOSPADM

## 2024-09-16 RX ORDER — AMOXICILLIN 250 MG
1 CAPSULE ORAL 2 TIMES DAILY
Status: DISCONTINUED | OUTPATIENT
Start: 2024-09-17 | End: 2024-09-18

## 2024-09-16 RX ORDER — SODIUM CHLORIDE, SODIUM GLUCONATE, SODIUM ACETATE, POTASSIUM CHLORIDE AND MAGNESIUM CHLORIDE 526; 502; 368; 37; 30 MG/100ML; MG/100ML; MG/100ML; MG/100ML; MG/100ML
INJECTION, SOLUTION INTRAVENOUS CONTINUOUS PRN
Status: DISCONTINUED | OUTPATIENT
Start: 2024-09-16 | End: 2024-09-16

## 2024-09-16 RX ORDER — LIDOCAINE 40 MG/G
CREAM TOPICAL
Status: DISCONTINUED | OUTPATIENT
Start: 2024-09-16 | End: 2024-09-18 | Stop reason: HOSPADM

## 2024-09-16 RX ORDER — ONDANSETRON 4 MG/1
4 TABLET, ORALLY DISINTEGRATING ORAL EVERY 30 MIN PRN
Status: DISCONTINUED | OUTPATIENT
Start: 2024-09-16 | End: 2024-09-16 | Stop reason: HOSPADM

## 2024-09-16 RX ORDER — FENTANYL CITRATE 50 UG/ML
INJECTION, SOLUTION INTRAMUSCULAR; INTRAVENOUS PRN
Status: DISCONTINUED | OUTPATIENT
Start: 2024-09-16 | End: 2024-09-16

## 2024-09-16 RX ORDER — HYDRALAZINE HYDROCHLORIDE 20 MG/ML
2.5-5 INJECTION INTRAMUSCULAR; INTRAVENOUS EVERY 10 MIN PRN
Status: DISCONTINUED | OUTPATIENT
Start: 2024-09-16 | End: 2024-09-16 | Stop reason: HOSPADM

## 2024-09-16 RX ORDER — BUPIVACAINE HYDROCHLORIDE AND EPINEPHRINE 2.5; 5 MG/ML; UG/ML
INJECTION, SOLUTION EPIDURAL; INFILTRATION; INTRACAUDAL; PERINEURAL PRN
Status: DISCONTINUED | OUTPATIENT
Start: 2024-09-16 | End: 2024-09-16 | Stop reason: HOSPADM

## 2024-09-16 RX ORDER — LABETALOL 20 MG/4 ML (5 MG/ML) INTRAVENOUS SYRINGE
PRN
Status: DISCONTINUED | OUTPATIENT
Start: 2024-09-16 | End: 2024-09-16

## 2024-09-16 RX ORDER — OXYCODONE HYDROCHLORIDE 10 MG/1
10 TABLET ORAL EVERY 4 HOURS PRN
Status: DISCONTINUED | OUTPATIENT
Start: 2024-09-16 | End: 2024-09-18 | Stop reason: HOSPADM

## 2024-09-16 RX ORDER — ONDANSETRON 2 MG/ML
4 INJECTION INTRAMUSCULAR; INTRAVENOUS EVERY 30 MIN PRN
Status: DISCONTINUED | OUTPATIENT
Start: 2024-09-16 | End: 2024-09-16 | Stop reason: HOSPADM

## 2024-09-16 RX ORDER — DEXAMETHASONE SODIUM PHOSPHATE 4 MG/ML
INJECTION, SOLUTION INTRA-ARTICULAR; INTRALESIONAL; INTRAMUSCULAR; INTRAVENOUS; SOFT TISSUE PRN
Status: DISCONTINUED | OUTPATIENT
Start: 2024-09-16 | End: 2024-09-16

## 2024-09-16 RX ORDER — HYDROMORPHONE HYDROCHLORIDE 1 MG/ML
0.2 INJECTION, SOLUTION INTRAMUSCULAR; INTRAVENOUS; SUBCUTANEOUS EVERY 5 MIN PRN
Status: DISCONTINUED | OUTPATIENT
Start: 2024-09-16 | End: 2024-09-16 | Stop reason: HOSPADM

## 2024-09-16 RX ORDER — LEVETIRACETAM 10 MG/ML
1000 INJECTION INTRAVASCULAR 2 TIMES DAILY
Status: DISCONTINUED | OUTPATIENT
Start: 2024-09-17 | End: 2024-09-17

## 2024-09-16 RX ORDER — DEXAMETHASONE SODIUM PHOSPHATE 4 MG/ML
4 INJECTION, SOLUTION INTRA-ARTICULAR; INTRALESIONAL; INTRAMUSCULAR; INTRAVENOUS; SOFT TISSUE EVERY 6 HOURS
Status: DISCONTINUED | OUTPATIENT
Start: 2024-09-17 | End: 2024-09-17

## 2024-09-16 RX ORDER — ONDANSETRON 2 MG/ML
4 INJECTION INTRAMUSCULAR; INTRAVENOUS EVERY 6 HOURS PRN
Status: DISCONTINUED | OUTPATIENT
Start: 2024-09-16 | End: 2024-09-18 | Stop reason: HOSPADM

## 2024-09-16 RX ORDER — HYDROMORPHONE HYDROCHLORIDE 1 MG/ML
0.2 INJECTION, SOLUTION INTRAMUSCULAR; INTRAVENOUS; SUBCUTANEOUS
Status: DISCONTINUED | OUTPATIENT
Start: 2024-09-16 | End: 2024-09-18 | Stop reason: HOSPADM

## 2024-09-16 RX ORDER — HYDROMORPHONE HYDROCHLORIDE 1 MG/ML
0.4 INJECTION, SOLUTION INTRAMUSCULAR; INTRAVENOUS; SUBCUTANEOUS EVERY 5 MIN PRN
Status: DISCONTINUED | OUTPATIENT
Start: 2024-09-16 | End: 2024-09-16 | Stop reason: HOSPADM

## 2024-09-16 RX ORDER — ACETAMINOPHEN 325 MG/1
975 TABLET ORAL EVERY 8 HOURS
Status: DISCONTINUED | OUTPATIENT
Start: 2024-09-17 | End: 2024-09-18 | Stop reason: HOSPADM

## 2024-09-16 RX ORDER — LIDOCAINE 40 MG/G
CREAM TOPICAL
Status: DISCONTINUED | OUTPATIENT
Start: 2024-09-16 | End: 2024-09-16 | Stop reason: HOSPADM

## 2024-09-16 RX ORDER — LABETALOL HYDROCHLORIDE 5 MG/ML
10-20 INJECTION, SOLUTION INTRAVENOUS EVERY 10 MIN PRN
Status: DISCONTINUED | OUTPATIENT
Start: 2024-09-16 | End: 2024-09-18 | Stop reason: HOSPADM

## 2024-09-16 RX ORDER — ACETAMINOPHEN 325 MG/1
650 TABLET ORAL EVERY 4 HOURS PRN
Status: DISCONTINUED | OUTPATIENT
Start: 2024-09-19 | End: 2024-09-18 | Stop reason: HOSPADM

## 2024-09-16 RX ADMIN — PROPOFOL 75 MCG/KG/MIN: 10 INJECTION, EMULSION INTRAVENOUS at 14:21

## 2024-09-16 RX ADMIN — SODIUM CHLORIDE, SODIUM GLUCONATE, SODIUM ACETATE, POTASSIUM CHLORIDE AND MAGNESIUM CHLORIDE: 526; 502; 368; 37; 30 INJECTION, SOLUTION INTRAVENOUS at 16:55

## 2024-09-16 RX ADMIN — MIDAZOLAM 2 MG: 1 INJECTION INTRAMUSCULAR; INTRAVENOUS at 12:57

## 2024-09-16 RX ADMIN — LABETALOL 20 MG/4 ML (5 MG/ML) INTRAVENOUS SYRINGE 10 MG: at 15:08

## 2024-09-16 RX ADMIN — PHENYLEPHRINE HYDROCHLORIDE 50 MCG: 10 INJECTION INTRAVENOUS at 16:08

## 2024-09-16 RX ADMIN — LABETALOL 20 MG/4 ML (5 MG/ML) INTRAVENOUS SYRINGE 10 MG: at 14:38

## 2024-09-16 RX ADMIN — FENTANYL CITRATE 100 MCG: 50 INJECTION INTRAMUSCULAR; INTRAVENOUS at 13:05

## 2024-09-16 RX ADMIN — PROPOFOL 160 MG: 10 INJECTION, EMULSION INTRAVENOUS at 13:09

## 2024-09-16 RX ADMIN — PHENYLEPHRINE HYDROCHLORIDE 100 MCG: 10 INJECTION INTRAVENOUS at 17:36

## 2024-09-16 RX ADMIN — PROPOFOL 30 MG: 10 INJECTION, EMULSION INTRAVENOUS at 14:23

## 2024-09-16 RX ADMIN — SODIUM CHLORIDE: 9 INJECTION, SOLUTION INTRAVENOUS at 22:40

## 2024-09-16 RX ADMIN — PROPOFOL 50 MG: 10 INJECTION, EMULSION INTRAVENOUS at 14:18

## 2024-09-16 RX ADMIN — ACETAMINOPHEN 975 MG: 325 TABLET ORAL at 12:43

## 2024-09-16 RX ADMIN — PHENYLEPHRINE HYDROCHLORIDE 50 MCG: 10 INJECTION INTRAVENOUS at 17:40

## 2024-09-16 RX ADMIN — PHENYLEPHRINE HYDROCHLORIDE 50 MCG: 10 INJECTION INTRAVENOUS at 16:13

## 2024-09-16 RX ADMIN — PROPOFOL 40 MG: 10 INJECTION, EMULSION INTRAVENOUS at 13:32

## 2024-09-16 RX ADMIN — HYDROMORPHONE HYDROCHLORIDE 0.5 MG: 1 INJECTION, SOLUTION INTRAMUSCULAR; INTRAVENOUS; SUBCUTANEOUS at 20:56

## 2024-09-16 RX ADMIN — SODIUM CHLORIDE, POTASSIUM CHLORIDE, SODIUM LACTATE AND CALCIUM CHLORIDE: 600; 310; 30; 20 INJECTION, SOLUTION INTRAVENOUS at 13:03

## 2024-09-16 RX ADMIN — LABETALOL 20 MG/4 ML (5 MG/ML) INTRAVENOUS SYRINGE 10 MG: at 21:29

## 2024-09-16 RX ADMIN — LEVETIRACETAM 1500 MG: 15 INJECTION INTRAVENOUS at 18:15

## 2024-09-16 RX ADMIN — FENTANYL CITRATE 50 MCG: 50 INJECTION INTRAMUSCULAR; INTRAVENOUS at 14:18

## 2024-09-16 RX ADMIN — FENTANYL CITRATE 50 MCG: 50 INJECTION INTRAMUSCULAR; INTRAVENOUS at 14:16

## 2024-09-16 RX ADMIN — HYDROMORPHONE HYDROCHLORIDE 0.5 MG: 1 INJECTION, SOLUTION INTRAMUSCULAR; INTRAVENOUS; SUBCUTANEOUS at 14:30

## 2024-09-16 RX ADMIN — Medication 20 MG: at 19:39

## 2024-09-16 RX ADMIN — Medication 30 MG: at 13:57

## 2024-09-16 RX ADMIN — PROPOFOL 70 MG: 10 INJECTION, EMULSION INTRAVENOUS at 13:34

## 2024-09-16 RX ADMIN — Medication 2 G: at 13:29

## 2024-09-16 RX ADMIN — SUGAMMADEX 200 MG: 100 INJECTION, SOLUTION INTRAVENOUS at 21:12

## 2024-09-16 RX ADMIN — LABETALOL HYDROCHLORIDE 10 MG: 5 INJECTION, SOLUTION INTRAVENOUS at 23:46

## 2024-09-16 RX ADMIN — Medication 20 MG: at 17:36

## 2024-09-16 RX ADMIN — FENTANYL CITRATE 50 MCG: 50 INJECTION INTRAMUSCULAR; INTRAVENOUS at 14:04

## 2024-09-16 RX ADMIN — Medication 20 MG: at 18:13

## 2024-09-16 RX ADMIN — PHENYLEPHRINE HYDROCHLORIDE 100 MCG: 10 INJECTION INTRAVENOUS at 19:25

## 2024-09-16 RX ADMIN — Medication 2 G: at 17:28

## 2024-09-16 RX ADMIN — NITROGLYCERIN 100 MCG: 10 INJECTION INTRAVENOUS at 21:16

## 2024-09-16 RX ADMIN — PHENYLEPHRINE HYDROCHLORIDE 50 MCG: 10 INJECTION INTRAVENOUS at 16:27

## 2024-09-16 RX ADMIN — Medication 50 MG: at 13:10

## 2024-09-16 RX ADMIN — DEXAMETHASONE SODIUM PHOSPHATE 6 MG: 4 INJECTION, SOLUTION INTRA-ARTICULAR; INTRALESIONAL; INTRAMUSCULAR; INTRAVENOUS; SOFT TISSUE at 13:09

## 2024-09-16 RX ADMIN — FENTANYL CITRATE 50 MCG: 50 INJECTION, SOLUTION INTRAMUSCULAR; INTRAVENOUS at 21:46

## 2024-09-16 RX ADMIN — PHENYLEPHRINE HYDROCHLORIDE 50 MCG: 10 INJECTION INTRAVENOUS at 16:02

## 2024-09-16 RX ADMIN — PHENYLEPHRINE HYDROCHLORIDE 50 MCG: 10 INJECTION INTRAVENOUS at 15:32

## 2024-09-16 RX ADMIN — MANNITOL 50 G: 20 INJECTION, SOLUTION INTRAVENOUS at 14:01

## 2024-09-16 RX ADMIN — ONDANSETRON 4 MG: 2 INJECTION INTRAMUSCULAR; INTRAVENOUS at 20:14

## 2024-09-16 RX ADMIN — PHENYLEPHRINE HYDROCHLORIDE 100 MCG: 10 INJECTION INTRAVENOUS at 16:32

## 2024-09-16 RX ADMIN — Medication 30 MG: at 19:02

## 2024-09-16 RX ADMIN — Medication 30 MG: at 15:33

## 2024-09-16 RX ADMIN — LIDOCAINE HYDROCHLORIDE 60 MG: 20 INJECTION, SOLUTION INFILTRATION; PERINEURAL at 13:09

## 2024-09-16 RX ADMIN — Medication 20 MG: at 16:21

## 2024-09-16 RX ADMIN — SODIUM CHLORIDE, SODIUM GLUCONATE, SODIUM ACETATE, POTASSIUM CHLORIDE AND MAGNESIUM CHLORIDE: 526; 502; 368; 37; 30 INJECTION, SOLUTION INTRAVENOUS at 15:57

## 2024-09-16 RX ADMIN — DEXAMETHASONE SODIUM PHOSPHATE 4 MG: 4 INJECTION, SOLUTION INTRA-ARTICULAR; INTRALESIONAL; INTRAMUSCULAR; INTRAVENOUS; SOFT TISSUE at 23:45

## 2024-09-16 RX ADMIN — ACETAMINOPHEN 975 MG: 325 TABLET ORAL at 23:46

## 2024-09-16 RX ADMIN — PHENYLEPHRINE HYDROCHLORIDE 50 MCG: 10 INJECTION INTRAVENOUS at 16:48

## 2024-09-16 RX ADMIN — Medication 20 MG: at 15:01

## 2024-09-16 RX ADMIN — NITROGLYCERIN 100 MCG: 10 INJECTION INTRAVENOUS at 21:20

## 2024-09-16 RX ADMIN — LABETALOL 20 MG/4 ML (5 MG/ML) INTRAVENOUS SYRINGE 10 MG: at 15:23

## 2024-09-16 RX ADMIN — LABETALOL 20 MG/4 ML (5 MG/ML) INTRAVENOUS SYRINGE 10 MG: at 20:27

## 2024-09-16 ASSESSMENT — ACTIVITIES OF DAILY LIVING (ADL)
ADLS_ACUITY_SCORE: 20
DOING_ERRANDS_INDEPENDENTLY_DIFFICULTY: NO
DIFFICULTY_EATING/SWALLOWING: NO
TOILETING_ISSUES: NO
ADLS_ACUITY_SCORE: 20
HEARING_DIFFICULTY_OR_DEAF: NO
CHANGE_IN_FUNCTIONAL_STATUS_SINCE_ONSET_OF_CURRENT_ILLNESS/INJURY: NO
ADLS_ACUITY_SCORE: 20
ADLS_ACUITY_SCORE: 20
CONCENTRATING,_REMEMBERING_OR_MAKING_DECISIONS_DIFFICULTY: NO
ADLS_ACUITY_SCORE: 20
DRESSING/BATHING_DIFFICULTY: NO
ADLS_ACUITY_SCORE: 20
FALL_HISTORY_WITHIN_LAST_SIX_MONTHS: NO
ADLS_ACUITY_SCORE: 20
VISION_MANAGEMENT: GLASSES
DIFFICULTY_COMMUNICATING: NO
WEAR_GLASSES_OR_BLIND: YES
WALKING_OR_CLIMBING_STAIRS_DIFFICULTY: NO
ADLS_ACUITY_SCORE: 20

## 2024-09-16 ASSESSMENT — VISUAL ACUITY
OU: GLASSES

## 2024-09-16 ASSESSMENT — ENCOUNTER SYMPTOMS: SEIZURES: 1

## 2024-09-16 NOTE — ANESTHESIA PROCEDURE NOTES
Airway       Patient location during procedure: OR       Procedure Start/Stop Times: 9/16/2024 1:09 PM and 9/16/2024 1:12 PM  Staff -        CRNA: Chan Dallas APRN CRNA       Performed By: CRNA  Consent for Airway        Urgency: elective  Indications and Patient Condition       Indications for airway management: re-procedural       Induction type:intravenous       Mask difficulty assessment: 1 - vent by mask    Final Airway Details       Final airway type: endotracheal airway       Successful airway: ETT - single  Endotracheal Airway Details        ETT size (mm): 7.0       Cuffed: yes       Successful intubation technique: direct laryngoscopy       DL Blade Type: Wade 2       Grade View of Cords: 1       Adjucts: stylet       Position: Right       Measured from: gums/teeth       Secured at (cm): 21       Bite block used: None    Post intubation assessment        Placement verified by: capnometry, equal breath sounds and chest rise        Number of attempts at approach: 1       Number of other approaches attempted: 0       Secured with: pink tape       Ease of procedure: easy       Dentition: Intact    Medication(s) Administered   Medication Administration Time: 9/16/2024 1:09 PM

## 2024-09-16 NOTE — ANESTHESIA PROCEDURE NOTES
Arterial Line Procedure Note    Pre-Procedure   Staff -        Anesthesiologist:  Cayden Jay MD       Performed By: anesthesiologist       Location: OR       Pre-Anesthestic Checklist: patient identified, IV checked, risks and benefits discussed, informed consent, monitors and equipment checked, pre-op evaluation and at physician/surgeon's request  Timeout:       Correct Patient: Yes        Correct Procedure: Yes        Correct Site: Yes        Correct Position: Yes   Line Placement:   This line was placed Post Induction  Procedure   Procedure: arterial line, new line and elective       Laterality: right       Insertion Site: radial.  Sterile Prep        Standard elements of sterile barrier followed       Skin prep: Chloraprep  Insertion/Injection        Technique: Seldinger Technique        Catheter Type/Size: 20 G, 12 cm  Narrative         Secured by: suture       Tegaderm dressing used.       Complications: None apparent,        Arterial waveform: Yes        IBP within 10% of NIBP: Yes

## 2024-09-17 ENCOUNTER — APPOINTMENT (OUTPATIENT)
Dept: OCCUPATIONAL THERAPY | Facility: CLINIC | Age: 44
End: 2024-09-17
Payer: COMMERCIAL

## 2024-09-17 LAB
ANION GAP SERPL CALCULATED.3IONS-SCNC: 14 MMOL/L (ref 7–15)
BUN SERPL-MCNC: 11.1 MG/DL (ref 6–20)
CALCIUM SERPL-MCNC: 7.6 MG/DL (ref 8.8–10.4)
CHLORIDE SERPL-SCNC: 102 MMOL/L (ref 98–107)
CREAT SERPL-MCNC: 0.48 MG/DL (ref 0.51–0.95)
EGFRCR SERPLBLD CKD-EPI 2021: >90 ML/MIN/1.73M2
ERYTHROCYTE [DISTWIDTH] IN BLOOD BY AUTOMATED COUNT: 14.4 % (ref 10–15)
GLUCOSE SERPL-MCNC: 132 MG/DL (ref 70–99)
HCO3 SERPL-SCNC: 21 MMOL/L (ref 22–29)
HCT VFR BLD AUTO: 31.8 % (ref 35–47)
HGB BLD-MCNC: 10.3 G/DL (ref 11.7–15.7)
LACTATE SERPL-SCNC: 2 MMOL/L (ref 0.7–2)
LACTATE SERPL-SCNC: 2.7 MMOL/L (ref 0.7–2)
LACTATE SERPL-SCNC: 3.4 MMOL/L (ref 0.7–2)
LACTATE SERPL-SCNC: 3.9 MMOL/L (ref 0.7–2)
LACTATE SERPL-SCNC: 4.2 MMOL/L (ref 0.7–2)
LACTATE SERPL-SCNC: 5.1 MMOL/L (ref 0.7–2)
MAGNESIUM SERPL-MCNC: 2.3 MG/DL (ref 1.7–2.3)
MCH RBC QN AUTO: 26.5 PG (ref 26.5–33)
MCHC RBC AUTO-ENTMCNC: 32.4 G/DL (ref 31.5–36.5)
MCV RBC AUTO: 82 FL (ref 78–100)
PHOSPHATE SERPL-MCNC: 3 MG/DL (ref 2.5–4.5)
PLATELET # BLD AUTO: 280 10E3/UL (ref 150–450)
POTASSIUM SERPL-SCNC: 3.9 MMOL/L (ref 3.4–5.3)
RBC # BLD AUTO: 3.88 10E6/UL (ref 3.8–5.2)
SODIUM SERPL-SCNC: 137 MMOL/L (ref 135–145)
WBC # BLD AUTO: 18.3 10E3/UL (ref 4–11)

## 2024-09-17 PROCEDURE — 36415 COLL VENOUS BLD VENIPUNCTURE: CPT

## 2024-09-17 PROCEDURE — 97530 THERAPEUTIC ACTIVITIES: CPT | Mod: GO

## 2024-09-17 PROCEDURE — 250N000013 HC RX MED GY IP 250 OP 250 PS 637

## 2024-09-17 PROCEDURE — 200N000002 HC R&B ICU UMMC

## 2024-09-17 PROCEDURE — 97535 SELF CARE MNGMENT TRAINING: CPT | Mod: GO

## 2024-09-17 PROCEDURE — 85027 COMPLETE CBC AUTOMATED: CPT

## 2024-09-17 PROCEDURE — 83735 ASSAY OF MAGNESIUM: CPT | Performed by: NEUROLOGICAL SURGERY

## 2024-09-17 PROCEDURE — 250N000012 HC RX MED GY IP 250 OP 636 PS 637

## 2024-09-17 PROCEDURE — 250N000009 HC RX 250: Performed by: PSYCHIATRY & NEUROLOGY

## 2024-09-17 PROCEDURE — 250N000009 HC RX 250

## 2024-09-17 PROCEDURE — 97165 OT EVAL LOW COMPLEX 30 MIN: CPT | Mod: GO

## 2024-09-17 PROCEDURE — 84100 ASSAY OF PHOSPHORUS: CPT | Performed by: NEUROLOGICAL SURGERY

## 2024-09-17 PROCEDURE — 80048 BASIC METABOLIC PNL TOTAL CA: CPT

## 2024-09-17 PROCEDURE — 83605 ASSAY OF LACTIC ACID: CPT

## 2024-09-17 PROCEDURE — 250N000013 HC RX MED GY IP 250 OP 250 PS 637: Performed by: NEUROLOGICAL SURGERY

## 2024-09-17 PROCEDURE — 250N000011 HC RX IP 250 OP 636

## 2024-09-17 PROCEDURE — 999N000147 HC STATISTIC PT IP EVAL DEFER

## 2024-09-17 RX ORDER — THIAMINE HYDROCHLORIDE 100 MG/ML
250 INJECTION, SOLUTION INTRAMUSCULAR; INTRAVENOUS DAILY
Status: DISCONTINUED | OUTPATIENT
Start: 2024-09-20 | End: 2024-09-17

## 2024-09-17 RX ORDER — DEXAMETHASONE 4 MG/1
4 TABLET ORAL 3 TIMES DAILY
Status: DISCONTINUED | OUTPATIENT
Start: 2024-09-19 | End: 2024-09-17

## 2024-09-17 RX ORDER — THIAMINE HYDROCHLORIDE 100 MG/ML
500 INJECTION, SOLUTION INTRAMUSCULAR; INTRAVENOUS 3 TIMES DAILY
Status: DISCONTINUED | OUTPATIENT
Start: 2024-09-17 | End: 2024-09-17

## 2024-09-17 RX ORDER — DEXAMETHASONE 4 MG/1
4 TABLET ORAL EVERY 12 HOURS SCHEDULED
Status: DISCONTINUED | OUTPATIENT
Start: 2024-09-18 | End: 2024-09-18 | Stop reason: HOSPADM

## 2024-09-17 RX ORDER — DEXAMETHASONE 4 MG/1
4 TABLET ORAL DAILY
Status: DISCONTINUED | OUTPATIENT
Start: 2024-09-23 | End: 2024-09-17

## 2024-09-17 RX ORDER — DEXAMETHASONE 2 MG/1
2 TABLET ORAL EVERY 12 HOURS SCHEDULED
Status: DISCONTINUED | OUTPATIENT
Start: 2024-09-19 | End: 2024-09-18 | Stop reason: HOSPADM

## 2024-09-17 RX ORDER — SODIUM CHLORIDE, SODIUM GLUCONATE, SODIUM ACETATE, POTASSIUM CHLORIDE AND MAGNESIUM CHLORIDE 526; 502; 368; 37; 30 MG/100ML; MG/100ML; MG/100ML; MG/100ML; MG/100ML
1000 INJECTION, SOLUTION INTRAVENOUS ONCE
Status: COMPLETED | OUTPATIENT
Start: 2024-09-17 | End: 2024-09-17

## 2024-09-17 RX ORDER — DEXAMETHASONE 4 MG/1
4 TABLET ORAL 4 TIMES DAILY
Status: COMPLETED | OUTPATIENT
Start: 2024-09-17 | End: 2024-09-18

## 2024-09-17 RX ORDER — LEVETIRACETAM 500 MG/1
1000 TABLET ORAL 2 TIMES DAILY
Status: DISCONTINUED | OUTPATIENT
Start: 2024-09-17 | End: 2024-09-18 | Stop reason: HOSPADM

## 2024-09-17 RX ORDER — THIAMINE HYDROCHLORIDE 100 MG/ML
250 INJECTION, SOLUTION INTRAMUSCULAR; INTRAVENOUS DAILY
Status: DISCONTINUED | OUTPATIENT
Start: 2024-09-19 | End: 2024-09-18 | Stop reason: HOSPADM

## 2024-09-17 RX ORDER — PANTOPRAZOLE SODIUM 40 MG/1
40 TABLET, DELAYED RELEASE ORAL
Status: DISCONTINUED | OUTPATIENT
Start: 2024-09-17 | End: 2024-09-18 | Stop reason: HOSPADM

## 2024-09-17 RX ORDER — SODIUM CHLORIDE, SODIUM GLUCONATE, SODIUM ACETATE, POTASSIUM CHLORIDE AND MAGNESIUM CHLORIDE 526; 502; 368; 37; 30 MG/100ML; MG/100ML; MG/100ML; MG/100ML; MG/100ML
INJECTION, SOLUTION INTRAVENOUS CONTINUOUS
Status: DISPENSED | OUTPATIENT
Start: 2024-09-17 | End: 2024-09-18

## 2024-09-17 RX ORDER — DEXAMETHASONE 4 MG/1
4 TABLET ORAL 4 TIMES DAILY
Status: DISCONTINUED | OUTPATIENT
Start: 2024-09-17 | End: 2024-09-17

## 2024-09-17 RX ORDER — DEXAMETHASONE 4 MG/1
4 TABLET ORAL 2 TIMES DAILY
Status: DISCONTINUED | OUTPATIENT
Start: 2024-09-21 | End: 2024-09-17

## 2024-09-17 RX ORDER — THIAMINE HYDROCHLORIDE 100 MG/ML
500 INJECTION, SOLUTION INTRAMUSCULAR; INTRAVENOUS 3 TIMES DAILY
Status: DISCONTINUED | OUTPATIENT
Start: 2024-09-17 | End: 2024-09-18 | Stop reason: HOSPADM

## 2024-09-17 RX ORDER — DEXAMETHASONE 1 MG
1 TABLET ORAL EVERY 12 HOURS SCHEDULED
Status: DISCONTINUED | OUTPATIENT
Start: 2024-09-23 | End: 2024-09-18 | Stop reason: HOSPADM

## 2024-09-17 RX ADMIN — DEXAMETHASONE 4 MG: 4 TABLET ORAL at 11:12

## 2024-09-17 RX ADMIN — THIAMINE HYDROCHLORIDE 500 MG: 100 INJECTION, SOLUTION INTRAMUSCULAR; INTRAVENOUS at 15:07

## 2024-09-17 RX ADMIN — DEXAMETHASONE 4 MG: 4 TABLET ORAL at 20:03

## 2024-09-17 RX ADMIN — ACETAMINOPHEN 975 MG: 325 TABLET ORAL at 08:07

## 2024-09-17 RX ADMIN — HYDRALAZINE HYDROCHLORIDE 10 MG: 20 INJECTION INTRAMUSCULAR; INTRAVENOUS at 01:05

## 2024-09-17 RX ADMIN — LEVETIRACETAM 1000 MG: 500 TABLET, FILM COATED ORAL at 08:07

## 2024-09-17 RX ADMIN — OXYCODONE HYDROCHLORIDE 5 MG: 5 TABLET ORAL at 18:38

## 2024-09-17 RX ADMIN — OXYCODONE HYDROCHLORIDE 5 MG: 5 TABLET ORAL at 12:54

## 2024-09-17 RX ADMIN — SENNOSIDES AND DOCUSATE SODIUM 1 TABLET: 8.6; 5 TABLET ORAL at 20:03

## 2024-09-17 RX ADMIN — OXYCODONE HYDROCHLORIDE 5 MG: 5 TABLET ORAL at 05:05

## 2024-09-17 RX ADMIN — SENNOSIDES AND DOCUSATE SODIUM 1 TABLET: 8.6; 5 TABLET ORAL at 08:07

## 2024-09-17 RX ADMIN — DEXAMETHASONE 4 MG: 4 TABLET ORAL at 15:07

## 2024-09-17 RX ADMIN — THIAMINE HYDROCHLORIDE 500 MG: 100 INJECTION, SOLUTION INTRAMUSCULAR; INTRAVENOUS at 11:12

## 2024-09-17 RX ADMIN — PANTOPRAZOLE SODIUM 40 MG: 40 TABLET, DELAYED RELEASE ORAL at 11:11

## 2024-09-17 RX ADMIN — OXYCODONE HYDROCHLORIDE 5 MG: 5 TABLET ORAL at 23:19

## 2024-09-17 RX ADMIN — SODIUM CHLORIDE, SODIUM GLUCONATE, SODIUM ACETATE, POTASSIUM CHLORIDE AND MAGNESIUM CHLORIDE: 526; 502; 368; 37; 30 INJECTION, SOLUTION INTRAVENOUS at 09:20

## 2024-09-17 RX ADMIN — SODIUM CHLORIDE, SODIUM GLUCONATE, SODIUM ACETATE, POTASSIUM CHLORIDE AND MAGNESIUM CHLORIDE 1000 ML: 526; 502; 368; 37; 30 INJECTION, SOLUTION INTRAVENOUS at 08:08

## 2024-09-17 RX ADMIN — ACETAMINOPHEN 975 MG: 325 TABLET ORAL at 15:06

## 2024-09-17 RX ADMIN — ACETAMINOPHEN 975 MG: 325 TABLET ORAL at 23:19

## 2024-09-17 RX ADMIN — SODIUM CHLORIDE, SODIUM GLUCONATE, SODIUM ACETATE, POTASSIUM CHLORIDE AND MAGNESIUM CHLORIDE: 526; 502; 368; 37; 30 INJECTION, SOLUTION INTRAVENOUS at 16:01

## 2024-09-17 RX ADMIN — THIAMINE HYDROCHLORIDE 500 MG: 100 INJECTION, SOLUTION INTRAMUSCULAR; INTRAVENOUS at 20:04

## 2024-09-17 RX ADMIN — SODIUM CHLORIDE, SODIUM GLUCONATE, SODIUM ACETATE, POTASSIUM CHLORIDE AND MAGNESIUM CHLORIDE 1000 ML: 526; 502; 368; 37; 30 INJECTION, SOLUTION INTRAVENOUS at 03:27

## 2024-09-17 RX ADMIN — PANTOPRAZOLE SODIUM 40 MG: 40 TABLET, DELAYED RELEASE ORAL at 15:54

## 2024-09-17 RX ADMIN — POLYETHYLENE GLYCOL 3350 17 G: 17 POWDER, FOR SOLUTION ORAL at 08:07

## 2024-09-17 RX ADMIN — SODIUM CHLORIDE, SODIUM GLUCONATE, SODIUM ACETATE, POTASSIUM CHLORIDE AND MAGNESIUM CHLORIDE 1000 ML: 526; 502; 368; 37; 30 INJECTION, SOLUTION INTRAVENOUS at 05:01

## 2024-09-17 RX ADMIN — LEVETIRACETAM 1000 MG: 500 TABLET, FILM COATED ORAL at 20:03

## 2024-09-17 RX ADMIN — DEXAMETHASONE 4 MG: 4 TABLET ORAL at 08:07

## 2024-09-17 ASSESSMENT — ACTIVITIES OF DAILY LIVING (ADL)
ADLS_ACUITY_SCORE: 29
ADLS_ACUITY_SCORE: 29
ADLS_ACUITY_SCORE: 25
ADLS_ACUITY_SCORE: 25
ADLS_ACUITY_SCORE: 29
ADLS_ACUITY_SCORE: 27
ADLS_ACUITY_SCORE: 29
ADLS_ACUITY_SCORE: 25
ADLS_ACUITY_SCORE: 29
ADLS_ACUITY_SCORE: 25
ADLS_ACUITY_SCORE: 29
ADLS_ACUITY_SCORE: 25
ADLS_ACUITY_SCORE: 29
ADLS_ACUITY_SCORE: 25
ADLS_ACUITY_SCORE: 29
ADLS_ACUITY_SCORE: 25
ADLS_ACUITY_SCORE: 29
ADLS_ACUITY_SCORE: 25

## 2024-09-17 ASSESSMENT — VISUAL ACUITY
OU: GLASSES

## 2024-09-17 NOTE — PHARMACY-ADMISSION MEDICATION HISTORY
Pharmacist Admission Medication History    Admission medication history is complete. The information provided in this note is only as accurate as the sources available at the time of the update.    Information Source(s): Patient and CareEverywhere/SureScripts via in-person    Pertinent Information: Medication reconciliation completed at the bedside with patient and father present. Of note:  Patient explained dexamethasone and levetiracetam were started on Monday 9/9 for seizure.   Omeprazole is a new medication started on Monday 9/9 also.  Patient explained she takes 2000 mg of Keppra a day - I clarified that she takes 1000 mg in the morning and 1000 mg in the evening.  Patient asked if one of her new medications could be contributing to new onset of trouble sleeping. I explained that dexamethasone may be contributing as her evening dose is taken around 9 pm.     Changes made to PTA medication list:  Added: None  Deleted: None  Changed: None    Allergies reviewed with patient and updates made in EHR: yes, confirmed with patient no allergies to medications.    Medication History Completed By: Patt Anand, PGY-1 Pharmacy Resident  9/17/2024 2:20 PM    PTA Med List   Medication Sig Last Dose    dexAMETHasone (DECADRON) 4 MG tablet 4 mg 3 times daily. 9/16/2024 at 0400    levETIRAcetam (KEPPRA) 1000 MG tablet Take 1,000 mg by mouth 2 times daily. 9/16/2024 at 0840    omeprazole (PRILOSEC) 40 MG DR capsule Take 40 mg by mouth daily. 9/16/2024 at 0400

## 2024-09-17 NOTE — PROGRESS NOTES
"   09/17/24 0835   Appointment Info   Signing Clinician's Name / Credentials (OT) Mary Hamilton OTR/L   Rehab Comments (OT) crani precs   Living Environment   People in Home significant other  (Jamil villarreal)   Current Living Arrangements house   Home Accessibility stairs to enter home;stairs within home   Number of Stairs, Main Entrance 3   Stair Railings, Main Entrance railings on both sides of stairs   Number of Stairs, Within Home, Primary seven   Stair Railings, Within Home, Primary railings on both sides of stairs   Transportation Anticipated family or friend will provide   Living Environment Comments Pt lives with cherelle in split level house. 3 LUIS with josue hand rails, 7 steps up/down with josue hand rails. Has both tub/shower and walk-in shower at home (tub upstairs, shower downstairs), no DME.   Self-Care   Usual Activity Tolerance good   Regular Exercise No   Equipment Currently Used at Home none   Fall history within last six months no   Activity/Exercise/Self-Care Comment IND in ADLs prior to admission   Instrumental Activities of Daily Living (IADL)   IADL Comments IND in IADLs prior to admission, works full time in finance   General Information   Onset of Illness/Injury or Date of Surgery 09/16/24   Referring Physician Onofre Hobbs MD   Patient/Family Therapy Goal Statement (OT) Return home   Additional Occupational Profile Info/Pertinent History of Current Problem Per EMR, \"44 year old female with past medical history significant for uterine leiomyoma and fibroids and recent seizure presents for elective resection of a LEFT frontal mass, presumed meningioma. She underwent a left frontal craniotomy and resection of tumor on 9/16/2024 with Dr. Desai.\"   Existing Precautions/Restrictions fall;other (see comments)  (crani)   Left Upper Extremity (Weight-bearing Status) partial weight-bearing (PWB)  (<10 lbs lifting)   Right Upper Extremity (Weight-bearing Status) partial weight-bearing (PWB)  (<10 lbs " lifting)   Left Lower Extremity (Weight-bearing Status) full weight-bearing (FWB)   Right Lower Extremity (Weight-bearing Status) full weight-bearing (FWB)   General Observations and Info Activity: ambulate with assist   Cognitive Status Examination   Orientation Status orientation to person, place and time   Visual Perception   Visual Impairment/Limitations corrective lenses full-time;WFL   Sensory   Sensory Quick Adds sensation intact   Pain Assessment   Patient Currently in Pain Yes, see Vital Sign flowsheet  (BLE (likely d/t steroid per pt) and min headache pain)   Posture   Posture forward head position;protracted shoulders   Range of Motion Comprehensive   General Range of Motion bilateral upper extremity ROM WFL   Strength Comprehensive (MMT)   Comment, General Manual Muscle Testing (MMT) Assessment general weakness, shakiness noted this date   Coordination   Coordination Comments Pt with shakiness impacting FMC for g/h tasks however still able to complete with SBA   Bed Mobility   Comment (Bed Mobility) not assessed, in chair   Transfers   Transfers sit-stand transfer   Transfer Comments SBA w/ IV pole   Balance   Balance Assessment standing dynamic balance   Standing Balance: Dynamic supervision   Position/Device Used, Standing Balance walker, front-wheeled   Activities of Daily Living   BADL Assessment/Intervention bathing;lower body dressing;grooming;toileting   Bathing Assessment/Intervention   [place] Level (Bathing) minimum assist (75% patient effort)   Comment, (Bathing) per clinical judgement   Lower Body Dressing Assessment/Training   Comment, (Lower Body Dressing) figure four position, adjusting socks   [place] Level (Lower Body Dressing) supervision;set up   Grooming Assessment/Training   [place] Level (Grooming) supervision;set up   Comment, (Grooming) standing at sink, g/h tasks   Toileting   Comment, (Toileting) per clinical judgement   [place] Level (Toileting) minimum  assist (75% patient effort)   Clinical Impression   Criteria for Skilled Therapeutic Interventions Met (OT) Yes, treatment indicated   OT Diagnosis Dec IND in I/ADLs   OT Problem List-Impairments impacting ADL problems related to;activity tolerance impaired;post-surgical precautions;strength   Assessment of Occupational Performance 1-3 Performance Deficits   Identified Performance Deficits dressing, bathing, toileting   Planned Therapy Interventions (OT) ADL retraining;IADL retraining;bed mobility training;strengthening;transfer training;progressive activity/exercise   Clinical Decision Making Complexity (OT) problem focused assessment/low complexity   Risk & Benefits of therapy have been explained evaluation/treatment results reviewed;care plan/treatment goals reviewed;risks/benefits reviewed;current/potential barriers reviewed;participants voiced agreement with care plan;participants included;patient;spouse/significant other   Clinical Impression Comments Pt appears below yet near functional baseline. Would benefit from continued OT services to promote safety and IND in I/ADLs   OT Total Evaluation Time   OT Eval, Low Complexity Minutes (66485) 9   OT Goals   Therapy Frequency (OT) 6 times/week   OT Predicted Duration/Target Date for Goal Attainment 10/01/24   OT Goals Hygiene/Grooming;Upper Body Dressing;Lower Body Dressing;Lower Body Bathing;Toilet Transfer/Toileting;Transfers;OT Goal 1;Aerobic Activity;OT Goal 2   OT: Hygiene/Grooming modified independent;within precautions   OT: Upper Body Dressing Modified independent;within precautions   OT: Lower Body Dressing Modified independent;within precautions   OT: Lower Body Bathing Modified independent;with precautions   OT: Transfer Modified independent;within precautions  (tub vs shower pending pt plans)   OT: Toilet Transfer/Toileting Modified independent;within precautions   OT: Perform aerobic activity with stable cardiovascular response continuous activity;20  minutes;ambulation   OT: Goal 1 Pt will adhere to crani precautions 100% of the time to promote strength, safety, and IND in I/ADLs   OT: Goal 2 Pt will complete 9 stairs to promote safety, strength and IND in mobility to ensure safe dc to home   OT Discharge Planning   OT Plan review precautions, dressing, functional mobility in [place], stairs   OT Discharge Recommendation (DC Rec) home with assist   OT Rationale for DC Rec Pt appears below yet near functional baseline. Anticipate pt will be safe for return to home with assist from family as needed. Has good family support. Will continue to monitor and update recs as appropriate   OT Brief overview of current status SBA-CGA w/ IV pole   Total Session Time   Timed Code Treatment Minutes 34   Total Session Time (sum of timed and untimed services) 43

## 2024-09-17 NOTE — PLAN OF CARE
Major Shift Events:      Neuro: Q1h neuro checks. A&Ox4. Follows commands. Very slight right pronator drift noted at 0500. PERRLA. Equal strength in all extremities and purposeful movement. Denies numbness/tingling. Pain treated with PRN meds. Crani head incision covered with primipore. At 0630, pt states she has 8/10 pain in lower legs - MD notifed during rounds.  CV: SBP goal < 140. Labatelol x1 and hydralazine x1 given. Sinus rhythm with HR in 60s. Afebrile. Right radial A-line very positional - removed this AM. Palpable pulses.  Resp: On RA with sats > 95%. LS clear. Denies SOB.   GI/: [facility] cath in place with good UO. Regular diet. Plasma-lyte 1 L bolus x2 given for elevated lactic acid levels.   IV: Left 20G. NS discontinued at 0630.    Plan: Continue monitoring neuro status and UO. Plan on moving to floor today. Notify team with any changes/updates.    For vital signs and complete assessments, please see documentation flowsheets.                              Problem: Adult Inpatient Plan of Care  Goal: Absence of Hospital-Acquired Illness or Injury  Intervention: Identify and Manage Fall Risk  Recent Flowsheet Documentation  Taken 9/17/2024 0400 by Snehal Arrington, RN  Safety Promotion/Fall Prevention:   activity supervised   clutter free environment maintained   increased rounding and observation   lighting adjusted   nonskid shoes/slippers when out of bed   patient and family education   room door open   room near nurse's station   supervised activity   safety round/check completed   room organization consistent  Taken 9/17/2024 0000 by Snehal Arrington, RN  Safety Promotion/Fall Prevention:   activity supervised   clutter free environment maintained   increased rounding and observation   lighting adjusted   nonskid shoes/slippers when out of bed   patient and family education   room door open   room near nurse's station   supervised activity   safety round/check completed   room organization  consistent  Intervention: Prevent Skin Injury  Recent Flowsheet Documentation  Taken 9/17/2024 0400 by Snehal Arrington RN  Body Position:   position changed independently   weight shifting  Skin Protection:   adhesive use limited   incontinence pads utilized   silicone foam dressing in place   pulse oximeter probe site changed   skin to device areas padded   skin to skin areas padded   transparent dressing maintained  Device Skin Pressure Protection:   absorbent pad utilized/changed   positioning supports utilized   pressure points protected   skin-to-device areas padded   skin-to-skin areas padded   tubing/devices free from skin contact  Taken 9/17/2024 0200 by Snehal Arrington RN  Body Position:   weight shifting   position changed independently  Taken 9/17/2024 0000 by Snehal Arrington RN  Body Position:   weight shifting   position changed independently  Skin Protection:   adhesive use limited   incontinence pads utilized   silicone foam dressing in place   pulse oximeter probe site changed   skin to device areas padded   skin to skin areas padded   transparent dressing maintained  Device Skin Pressure Protection:   absorbent pad utilized/changed   positioning supports utilized   pressure points protected   skin-to-device areas padded   skin-to-skin areas padded   tubing/devices free from skin contact  Intervention: Prevent and Manage VTE (Venous Thromboembolism) Risk  Recent Flowsheet Documentation  Taken 9/17/2024 0400 by Snehal Arrington RN  VTE Prevention/Management: SCDs on (sequential compression devices)  Taken 9/17/2024 0000 by Snehal Arrington RN  VTE Prevention/Management: SCDs on (sequential compression devices)  Intervention: Prevent Infection  Recent Flowsheet Documentation  Taken 9/17/2024 0400 by Snehal Arrington RN  Infection Prevention:   visitors restricted/screened   single patient room provided   rest/sleep promoted   hand hygiene promoted   equipment surfaces disinfected  Taken 9/17/2024 0000 by  Snehal Arrington RN  Infection Prevention:   visitors restricted/screened   single patient room provided   rest/sleep promoted   hand hygiene promoted   equipment surfaces disinfected  Goal: Optimal Comfort and Wellbeing  Intervention: Monitor Pain and Promote Comfort  Recent Flowsheet Documentation  Taken 9/17/2024 0400 by Snehal Arrington RN  Pain Management Interventions:   care clustered   emotional support   pain management plan reviewed with patient/caregiver   pillow support provided   quiet environment facilitated   relaxation techniques promoted   repositioned   rest  Taken 9/17/2024 0200 by Snehal Arrington RN  Pain Management Interventions:   pillow support provided   repositioned   rest  Taken 9/17/2024 0000 by Snehal Arrington RN  Pain Management Interventions:   medication (see MAR)   care clustered   emotional support   pain management plan reviewed with patient/caregiver   pillow support provided   quiet environment facilitated   relaxation techniques promoted   repositioned   rest  Intervention: Provide Person-Centered Care  Recent Flowsheet Documentation  Taken 9/17/2024 0400 by Snehal Arrington RN  Trust Relationship/Rapport:   care explained   choices provided   emotional support provided   empathic listening provided   questions answered   questions encouraged   reassurance provided   thoughts/feelings acknowledged  Taken 9/17/2024 0000 by Snehal Arrington RN  Trust Relationship/Rapport:   care explained   choices provided   emotional support provided   empathic listening provided   questions answered   questions encouraged   reassurance provided   thoughts/feelings acknowledged  Goal: Readiness for Transition of Care  Intervention: Mutually Develop Transition Plan  Recent Flowsheet Documentation  Taken 9/16/2024 2300 by Snehal Arrington RN  Equipment Currently Used at Home: none     Problem: Pain Acute  Goal: Optimal Pain Control and Function  Intervention: Develop Pain Management Plan  Recent  Flowsheet Documentation  Taken 9/17/2024 0400 by Snehal Arrington RN  Pain Management Interventions:   care clustered   emotional support   pain management plan reviewed with patient/caregiver   pillow support provided   quiet environment facilitated   relaxation techniques promoted   repositioned   rest  Taken 9/17/2024 0200 by Snehal Arrington RN  Pain Management Interventions:   pillow support provided   repositioned   rest  Taken 9/17/2024 0000 by Snehal Arrington RN  Pain Management Interventions:   medication (see MAR)   care clustered   emotional support   pain management plan reviewed with patient/caregiver   pillow support provided   quiet environment facilitated   relaxation techniques promoted   repositioned   rest  Intervention: Prevent or Manage Pain  Recent Flowsheet Documentation  Taken 9/17/2024 0400 by Snehal Arrington RN  Sensory Stimulation Regulation:   care clustered   lighting decreased   quiet environment promoted  Bowel Elimination Promotion:   adequate fluid intake promoted   ambulation promoted   privacy promoted  Medication Review/Management: medications reviewed  Taken 9/17/2024 0000 by Snehal Arrington RN  Sensory Stimulation Regulation:   care clustered   lighting decreased   quiet environment promoted  Bowel Elimination Promotion:   adequate fluid intake promoted   ambulation promoted   privacy promoted  Medication Review/Management: medications reviewed  Intervention: Optimize Psychosocial Wellbeing  Recent Flowsheet Documentation  Taken 9/17/2024 0400 by Snehal Arrington RN  Supportive Measures:   verbalization of feelings encouraged   self-care encouraged   relaxation techniques promoted   problem-solving facilitated   positive reinforcement provided   decision-making supported   active listening utilized  Taken 9/17/2024 0000 by Snehal Arrington RN  Supportive Measures:   verbalization of feelings encouraged   self-care encouraged   relaxation techniques promoted   problem-solving  facilitated   positive reinforcement provided   decision-making supported   active listening utilized     Problem: Surgery Nonspecified  Goal: Absence of Bleeding  Intervention: Monitor and Manage Bleeding  Recent Flowsheet Documentation  Taken 9/17/2024 0400 by Snehal Arrington RN  Bleeding Management: dressing monitored  Taken 9/17/2024 0000 by Snehal Arrington RN  Bleeding Management: dressing monitored  Goal: Effective Bowel Elimination  Intervention: Enhance Bowel Motility and Elimination  Recent Flowsheet Documentation  Taken 9/17/2024 0400 by Snehal Arrington RN  Bowel Motility Enhancement:   ambulation promoted   fluid intake encouraged   oral intake encouraged  Bowel Elimination Management:   toileting offered   relaxation techniques promoted   hygiene measures promoted  Taken 9/17/2024 0000 by Snehal Arrington RN  Bowel Motility Enhancement:   ambulation promoted   fluid intake encouraged   oral intake encouraged  Bowel Elimination Management:   toileting offered   relaxation techniques promoted   hygiene measures promoted  Goal: Fluid and Electrolyte Balance  Intervention: Monitor and Manage Fluid and Electrolyte Balance  Recent Flowsheet Documentation  Taken 9/17/2024 0400 by Snehal Arrington RN  Fluid/Electrolyte Management:   oral rehydration therapy initiated   fluids provided  Taken 9/17/2024 0000 by Snehal Arrington RN  Fluid/Electrolyte Management:   oral rehydration therapy initiated   fluids provided  Goal: Blood Glucose Level Within Targeted Range  Intervention: Optimize Glycemic Control  Recent Flowsheet Documentation  Taken 9/17/2024 0400 by Snehal Arrington RN  Glycemic Management: oral hydration promoted  Taken 9/17/2024 0000 by Snehal Arrington RN  Glycemic Management: oral hydration promoted  Goal: Absence of Infection Signs and Symptoms  Intervention: Prevent or Manage Infection  Recent Flowsheet Documentation  Taken 9/17/2024 0400 by Snehal Arrington RN  Infection Management: aseptic technique  maintained  Taken 9/17/2024 0000 by Snehal Arrington RN  Infection Management: aseptic technique maintained  Goal: Anesthesia/Sedation Recovery  Intervention: Optimize Anesthesia Recovery  Recent Flowsheet Documentation  Taken 9/17/2024 0400 by Snehal Arrington RN  Safety Promotion/Fall Prevention:   activity supervised   clutter free environment maintained   increased rounding and observation   lighting adjusted   nonskid shoes/slippers when out of bed   patient and family education   room door open   room near nurse's station   supervised activity   safety round/check completed   room organization consistent  Reorientation Measures:   calendar in view   clock in view   glasses use encouraged   familiar social contact encouraged  Taken 9/17/2024 0000 by Snehal Arrington RN  Safety Promotion/Fall Prevention:   activity supervised   clutter free environment maintained   increased rounding and observation   lighting adjusted   nonskid shoes/slippers when out of bed   patient and family education   room door open   room near nurse's station   supervised activity   safety round/check completed   room organization consistent  Reorientation Measures:   calendar in view   clock in view   glasses use encouraged   familiar social contact encouraged  Goal: Optimal Pain Control and Function  Intervention: Prevent or Manage Pain  Recent Flowsheet Documentation  Taken 9/17/2024 0400 by Snehal Arrington RN  Pain Management Interventions:   care clustered   emotional support   pain management plan reviewed with patient/caregiver   pillow support provided   quiet environment facilitated   relaxation techniques promoted   repositioned   rest  Taken 9/17/2024 0200 by Snehal Arrington RN  Pain Management Interventions:   pillow support provided   repositioned   rest  Taken 9/17/2024 0000 by Snehal Arrington RN  Pain Management Interventions:   medication (see MAR)   care clustered   emotional support   pain management plan reviewed with  patient/caregiver   pillow support provided   quiet environment facilitated   relaxation techniques promoted   repositioned   rest  Goal: Effective Urinary Elimination  Intervention: Monitor and Manage Urinary Retention  Recent Flowsheet Documentation  Taken 9/17/2024 0400 by Snehal Arrington RN  Urinary Elimination Promotion:   voiding relaxation promoted   catheter patency maintained  Taken 9/17/2024 0000 by Snehal Arrington RN  Urinary Elimination Promotion:   voiding relaxation promoted   catheter patency maintained  Goal: Effective Oxygenation and Ventilation  Intervention: Optimize Oxygenation and Ventilation  Recent Flowsheet Documentation  Taken 9/17/2024 0400 by Snehal Arrington RN  Airway/Ventilation Management:   pulmonary hygiene promoted   airway patency maintained   calming measures promoted  Head of Bed (HOB) Positioning: HOB at 30 degrees  Taken 9/17/2024 0200 by Snehal Arrington RN  Head of Bed (HOB) Positioning: HOB at 30 degrees  Taken 9/17/2024 0000 by Snehal Arrington RN  Airway/Ventilation Management:   pulmonary hygiene promoted   airway patency maintained   calming measures promoted  Head of Bed (HOB) Positioning: HOB at 30 degrees     Problem: Craniotomy/Craniectomy/Cranioplasty  Goal: Optimal Coping with Surgery  Intervention: Support Psychosocial Response to Surgery  Recent Flowsheet Documentation  Taken 9/17/2024 0400 by Snehal Arrington RN  Supportive Measures:   verbalization of feelings encouraged   self-care encouraged   relaxation techniques promoted   problem-solving facilitated   positive reinforcement provided   decision-making supported   active listening utilized  Taken 9/17/2024 0000 by Snehal Arrington RN  Supportive Measures:   verbalization of feelings encouraged   self-care encouraged   relaxation techniques promoted   problem-solving facilitated   positive reinforcement provided   decision-making supported   active listening utilized  Family/Support System Care:   support  provided   self-care encouraged   involvement promoted  Goal: Absence of Bleeding  Intervention: Monitor and Manage Bleeding  Recent Flowsheet Documentation  Taken 9/17/2024 0400 by Snehal Arrington RN  Bleeding Management: dressing monitored  Taken 9/17/2024 0000 by Snehal Arrington RN  Bleeding Management: dressing monitored  Goal: Effective Bowel Elimination  Intervention: Enhance Bowel Motility and Elimination  Recent Flowsheet Documentation  Taken 9/17/2024 0400 by Snehal Arrington RN  Bowel Motility Enhancement:   ambulation promoted   fluid intake encouraged   oral intake encouraged  Bowel Elimination Management:   toileting offered   relaxation techniques promoted   hygiene measures promoted  Taken 9/17/2024 0000 by Snehal Arrington RN  Bowel Motility Enhancement:   ambulation promoted   fluid intake encouraged   oral intake encouraged  Bowel Elimination Management:   toileting offered   relaxation techniques promoted   hygiene measures promoted  Goal: Optimal Cerebral Tissue Perfusion  Intervention: Protect and Optimize Cerebral Perfusion  Recent Flowsheet Documentation  Taken 9/17/2024 0400 by Snehal Arrington RN  Sensory Stimulation Regulation:   care clustered   lighting decreased   quiet environment promoted  Cerebral Perfusion Promotion:   blood pressure monitored   normothermia promoted  Taken 9/17/2024 0000 by Snehal Arrington RN  Sensory Stimulation Regulation:   care clustered   lighting decreased   quiet environment promoted  Cerebral Perfusion Promotion:   blood pressure monitored   normothermia promoted  Goal: Fluid and Electrolyte Balance  Intervention: Monitor and Manage Fluid and Electrolyte Balance  Recent Flowsheet Documentation  Taken 9/17/2024 0400 by Snehal Arrington RN  Fluid/Electrolyte Management:   oral rehydration therapy initiated   fluids provided  Taken 9/17/2024 0000 by Snehal Arrington RN  Fluid/Electrolyte Management:   oral rehydration therapy initiated   fluids provided  Goal:  Optimal Functional Ability  Intervention: Optimize Functional Ability  Recent Flowsheet Documentation  Taken 9/17/2024 0400 by Snehal Arrnigton RN  Self-Care Promotion:   independence encouraged   BADL personal objects within reach   BADL personal routines maintained  Activity Management: activity adjusted per tolerance  Activity Assistance Provided: assistance, stand-by  Taken 9/17/2024 0000 by Snehal Arrington RN  Self-Care Promotion:   independence encouraged   BADL personal objects within reach   BADL personal routines maintained  Activity Management: activity adjusted per tolerance  Activity Assistance Provided: assistance, stand-by  Goal: Absence of Infection Signs and Symptoms  Intervention: Prevent or Manage Infection  Recent Flowsheet Documentation  Taken 9/17/2024 0400 by Snehal Arrington RN  Infection Management: aseptic technique maintained  Taken 9/17/2024 0000 by Snehal Arrington RN  Infection Management: aseptic technique maintained  Goal: Anesthesia/Sedation Recovery  Intervention: Optimize Anesthesia Recovery  Recent Flowsheet Documentation  Taken 9/17/2024 0400 by Snehal Arrington RN  Safety Promotion/Fall Prevention:   activity supervised   clutter free environment maintained   increased rounding and observation   lighting adjusted   nonskid shoes/slippers when out of bed   patient and family education   room door open   room near nurse's station   supervised activity   safety round/check completed   room organization consistent  Reorientation Measures:   calendar in view   clock in view   glasses use encouraged   familiar social contact encouraged  Taken 9/17/2024 0000 by Snehal Arrington RN  Safety Promotion/Fall Prevention:   activity supervised   clutter free environment maintained   increased rounding and observation   lighting adjusted   nonskid shoes/slippers when out of bed   patient and family education   room door open   room near nurse's station   supervised activity   safety round/check  completed   room organization consistent  Reorientation Measures:   calendar in view   clock in view   glasses use encouraged   familiar social contact encouraged  Goal: Acceptable Pain Control  Intervention: Prevent or Manage Pain  Recent Flowsheet Documentation  Taken 9/17/2024 0400 by Snehal Arrington RN  Pain Management Interventions:   care clustered   emotional support   pain management plan reviewed with patient/caregiver   pillow support provided   quiet environment facilitated   relaxation techniques promoted   repositioned   rest  Taken 9/17/2024 0200 by Snehal Arrington RN  Pain Management Interventions:   pillow support provided   repositioned   rest  Taken 9/17/2024 0000 by Snehal Arrington RN  Pain Management Interventions:   medication (see MAR)   care clustered   emotional support   pain management plan reviewed with patient/caregiver   pillow support provided   quiet environment facilitated   relaxation techniques promoted   repositioned   rest  Goal: Effective Urinary Elimination  Intervention: Monitor and Manage Urinary Retention  Recent Flowsheet Documentation  Taken 9/17/2024 0400 by Snehal Arrington RN  Urinary Elimination Promotion:   voiding relaxation promoted   catheter patency maintained  Taken 9/17/2024 0000 by Snehal Arrington RN  Urinary Elimination Promotion:   voiding relaxation promoted   catheter patency maintained  Goal: Effective Oxygenation and Ventilation  Intervention: Optimize Oxygenation and Ventilation  Recent Flowsheet Documentation  Taken 9/17/2024 0400 by Snehal Arrington RN  Airway/Ventilation Management:   pulmonary hygiene promoted   airway patency maintained   calming measures promoted  Head of Bed (HOB) Positioning: HOB at 30 degrees  Taken 9/17/2024 0200 by Snehal Arrington RN  Head of Bed (HOB) Positioning: HOB at 30 degrees  Taken 9/17/2024 0000 by Snehal Arrington RN  Airway/Ventilation Management:   pulmonary hygiene promoted   airway patency maintained   calming  measures promoted  Head of Bed (HOB) Positioning: HOB at 30 degrees     Problem: Comorbidity Management  Goal: Maintenance of Asthma Control  Intervention: Maintain Asthma Symptom Control  Recent Flowsheet Documentation  Taken 9/17/2024 0400 by Snehal Arrington RN  Medication Review/Management: medications reviewed  Taken 9/17/2024 0000 by Snehal Arrington RN  Medication Review/Management: medications reviewed  Goal: Maintenance of Behavioral Health Symptom Control  Intervention: Maintain Behavioral Health Symptom Control  Recent Flowsheet Documentation  Taken 9/17/2024 0400 by Snehal Arrington RN  Medication Review/Management: medications reviewed  Taken 9/17/2024 0000 by Snehal Arrington RN  Medication Review/Management: medications reviewed  Goal: Maintenance of COPD Symptom Control  Intervention: Maintain COPD Symptom Control  Recent Flowsheet Documentation  Taken 9/17/2024 0400 by Snehal Arrington RN  Supportive Measures:   verbalization of feelings encouraged   self-care encouraged   relaxation techniques promoted   problem-solving facilitated   positive reinforcement provided   decision-making supported   active listening utilized  Medication Review/Management: medications reviewed  Taken 9/17/2024 0000 by Snehal Arrington RN  Supportive Measures:   verbalization of feelings encouraged   self-care encouraged   relaxation techniques promoted   problem-solving facilitated   positive reinforcement provided   decision-making supported   active listening utilized  Medication Review/Management: medications reviewed  Goal: Blood Glucose Levels Within Targeted Range  Intervention: Monitor and Manage Glycemia  Recent Flowsheet Documentation  Taken 9/17/2024 0400 by Snehal Arrington RN  Glycemic Management: oral hydration promoted  Medication Review/Management: medications reviewed  Taken 9/17/2024 0000 by Snehal Arrington RN  Glycemic Management: oral hydration promoted  Medication Review/Management: medications  reviewed  Goal: Maintenance of Heart Failure Symptom Control  Intervention: Maintain Heart Failure Management  Recent Flowsheet Documentation  Taken 9/17/2024 0400 by Snehal Arrington RN  Medication Review/Management: medications reviewed  Taken 9/17/2024 0000 by Snehal Arrington RN  Medication Review/Management: medications reviewed  Goal: Blood Pressure in Desired Range  Intervention: Maintain Blood Pressure Management  Recent Flowsheet Documentation  Taken 9/17/2024 0400 by Snehal Arrington RN  Medication Review/Management: medications reviewed  Taken 9/17/2024 0000 by Snehal Arrington RN  Medication Review/Management: medications reviewed  Goal: Maintenance of Osteoarthritis Symptom Control  Intervention: Maintain Osteoarthritis Symptom Control  Recent Flowsheet Documentation  Taken 9/17/2024 0400 by Snehal Arrington RN  Activity Management: activity adjusted per tolerance  Medication Review/Management: medications reviewed  Taken 9/17/2024 0000 by Snehal Arrington RN  Activity Management: activity adjusted per tolerance  Medication Review/Management: medications reviewed  Goal: Bariatric Home Regimen Maintained  Intervention: Maintain and Manage Postbariatric Surgery Care  Recent Flowsheet Documentation  Taken 9/17/2024 0400 by Snehal Arrington RN  Oral Nutrition Promotion:   rest periods promoted   social interaction promoted   physical activity promoted  Medication Review/Management: medications reviewed  Taken 9/17/2024 0000 by Snehal Arrington RN  Oral Nutrition Promotion:   rest periods promoted   social interaction promoted   physical activity promoted  Medication Review/Management: medications reviewed  Goal: Maintenance of Seizure Control  Intervention: Maintain Seizure Symptom Control  Recent Flowsheet Documentation  Taken 9/17/2024 0400 by Snehal Arrington RN  Sensory Stimulation Regulation:   care clustered   lighting decreased   quiet environment promoted  Seizure Precautions:   activity supervised    clutter-free environment maintained  Medication Review/Management: medications reviewed  Taken 9/17/2024 0000 by Snehal Arrington RN  Sensory Stimulation Regulation:   care clustered   lighting decreased   quiet environment promoted  Seizure Precautions:   activity supervised   clutter-free environment maintained  Medication Review/Management: medications reviewed   Goal Outcome Evaluation:

## 2024-09-17 NOTE — OP NOTE
PATIENT NAME: Lanette Kaufman  YOB: 1980  MRN: [id number]    DATE OF PROCEDURE: 9/16/2024     PREOPERATIVE DIAGNOSIS:    Left supraorbital meningioma  Left frontal lobe compression and brain edema secondary to 1.     POSTOPERATIVE DIAGNOSIS:  Left supraorbital meningioma  Left frontal lobe compression and brain edema secondary to 1.    PROCEDURES PERFORMED:  Left frontotemporal craniotomy and resection of tumor   Use of intraoperative microscope  Resection of left orbital roof, skull base procedure     STAFF SURGEON:  Valentin Desai MD     RESIDENT SURGEON:  Reno Corbett MD, PhD    ESTIMATED BLOOD LOSS: 200 mL    ANESTHESIA: General    IMPLANTS:   Graft DuraGen, lot #[id number], [employer]  Bur hole cover, The X Train  Plate, 12 mm, The X Train, x 2  Plate, 14 x 14 mm, The X Train  Low-profile screws 1.5 X 04 mm, Synthes, x 10    SPECIMEN: samples were sent for permanent pathology    FINDINGS: Fibrous tumor with dural attachment along left orbital roof. Extensive blood supply from supraorbital dura. Mild hyperostosis along left orbital roof.     INDICATION FOR THE PROCEDURE:   Ms. Kaufman is a 44-year-old right-handed woman who presented to the referring hospital emergency department with a first-time seizure.  On workup she was found to have a left anterior fossa (left supraorbital) tumor with substantial associated brain edema. Body scanning did not show any tumors elsewhere except for uterine fibroids. Based on the tumor size, the amount of edema, and her young age, we determined that surgical resection is indicated. The patient consented to the above stated procedures.    DESCRIPTION OF PROCEDURE:  The patient was brought to the operating room and the identity of the patient was confirmed. General anesthesia was initiated and a Hsu was placed. The patient was positioned supine with her head secured in a [place] and all pressure points were padded. Her head was turned about 30  degrees to the right and her neck was extended. The surgical site was prepared and draped in a common surgical fashion. A time out was performed.    A left curvilinear frontotemporal incision was planned behind the hairline. Local anesthetic was infiltrated along the planned incision.  With a #10 blade, the incision was made through the skin and galea. The areolar layer was identified to dissect free the scalp and preserve the pericranium.  The scalp was retracted with fishhook retractors.  Next, a vascularized pericranium flap was incised and elevated off the skull. Hemostasis was achieved with the bipolar. Using the monopolar, the temporalis muscle and fascia was incised, leaving a cuff attached to the superior temporal line.  The temporalis muscle was then dissected off the cranium. The myocutaneous flap was elevated and retracted anteriorly and inferiorly.     Using the hand drill with a #5 cutter, two bur holes were created, one in the keyhole and one about an inch above the zygoma in the squamous temporal bone.  The underlying dura was  from the inner table with a dental instrument.  Next using the B1 with a footplate, a frontotemporal craniotomy was completed. The bone flap was elevated and set aside. The sphenoid ridge was flattened using rongeurs and the drill.  Next, the dura was opened in a curvilinear fashion and retracted against the sphenoid ridge with tack up sutures.  Despite osmodiuresis, the brain was very full. The microscope was brought into the field. In order to achieve brain relaxation, the sphenoid ridge was followed down to the carotid cistern.  Under visualization, the arachnoid layer of the carotid cistern was opened with the [place] blade and subfrontal dissection was performed with the microscissors.  Clear CSF was released and good brain relaxation was achieved.  This also exposed the most superficial aspect of the tumor along the orbital surface of the left frontal lobe. We  were able to elevate the relaxed frontal lobe to expose the tumor further.    Next, several cotton patties were placed to protect the frontal lobe.  The tumor was incised and samples were sent for permanent pathology.  Using the ultrasonic aspirator and microscissors, the tumor was debulked. Specimens were sent for permanent pathology. There was brisk bleeding from the supraorbital dura which was controlled with bipolar cautery. The gross appearance was consistent with meningioma. With the majority of the tumor removed, attention was then turned to dissect the capsule.  With the bipolar, the capsule was cauterized and dissected off the surrounding brain. There were some areas of subpial dissection needed to remove the capsule. After resection of the gross tumor, attention was turned to resect the supraorbital dura. It was incised with a [place] blade and dissected off the anterior fossa floor.  The surgical area was inspected and some remnants of thickened dura were removed with the microscissors.  At this point, the hand-held drill with a 3 mm daryn drill bit was used to drill the hyperostotic bone. This also controlled bleeding from the bone. The surgical site was again inspected.      The surgical site was irrigated with normal saline. The resection cavity was lined with Surgicel. Next, attention was turned to the closure.  The dura was closed on the convexity side with 4-0 Nurolon.  An onlay of DuraGen was placed. The bone flap was placed back and secured with Synthes titanium plates and screws.  Next, using 2-0 Vicryl, the temporalis muscle and fascia were reapproximated. The galea was closed using 2-0 vicryl in inverted interrupted fashion and the skin was closed with running 3-0 nylon. The incision was cleaned with a wet and dry gauze. Bacitracin ointment and a sterile dressing were applied.    The patient was extubated and transferred to the PACU.      At the end of the procedure, all counts of sponges,  needles and instruments were correct x 2.    Dr. Navarrete was present for the key portions and immediately available for the entire operation.     As dictated by:  Reno Corbett MD  Neurosurgery Resident, PGY-6        ATTESTATION: My signature attests that I was present for the key portions and immediately available for the entire operation described above. I agree with the above findings.    HANY NAVARRETE MD

## 2024-09-17 NOTE — ANESTHESIA POSTPROCEDURE EVALUATION
Patient: Lanette Kaufman    Procedure: Procedure(s):  Left frontal craniotomy and resection of tumor       Anesthesia Type:  General    Note:  Disposition: Admission; ICU            ICU Sign Out: Anesthesiologist/ICU physician sign out WAS performed   Postop Pain Control: Uneventful            Sign Out: Well controlled pain   PONV: No   Neuro/Psych: Uneventful            Sign Out: Acceptable/Baseline neuro status   Airway/Respiratory: Uneventful            Sign Out: Acceptable/Baseline resp. status   CV/Hemodynamics: Uneventful            Sign Out: Acceptable CV status; No obvious hypovolemia; No obvious fluid overload   Other NRE: NONE   DID A NON-ROUTINE EVENT OCCUR? No           Last vitals:  Vitals Value Taken Time   /84 09/16/24 2145   Temp     Pulse 63 09/16/24 2150   Resp 8 09/16/24 2150   SpO2 100 % 09/16/24 2150   Vitals shown include unfiled device data.    Electronically Signed By: Juancarlos Lujan MD  September 16, 2024  9:50 PM

## 2024-09-17 NOTE — PLAN OF CARE
Major Shift Events: 6A orders placed. Q4 neuros performed - no deficits noted, all checks WDL. Patient complains of BLE pain, managed with scheduled Tylenol + PRN Oxycodone. Lactic elevated - received 1x liter fluid  bolus of plasma-lite, continuous IV fluids; lactic starting to trend downward. Blood pressures within goal, under 140 systolic with no intervention required. Continues with Decadron taper. Ambulating without assistance; up to chair for majority of shift.  Plan: Monitor overnight. Potential transfer to .    For vital signs and complete assessments, please see documentation flowsheets.            Goal Outcome Evaluation:      Plan of Care Reviewed With: patient, significant other          Outcome Evaluation: Floor orders. Continue to monitor neuros.    Problem: Adult Inpatient Plan of Care  Goal: Plan of Care Review  Description: The Plan of Care Review/Shift note should be completed every shift.  The Outcome Evaluation is a brief statement about your assessment that the patient is improving, declining, or no change.  This information will be displayed automatically on your shift  note.  Outcome: Progressing  Flowsheets (Taken 9/17/2024 1811)  Outcome Evaluation: Floor orders. Continue to monitor neuros.  Plan of Care Reviewed With:   patient   significant other

## 2024-09-17 NOTE — PROGRESS NOTES
[facility], [place]   09/17/2024  Neurosurgery Progress Note:      Interval History:   OR for LEFT frontal mass resection  Head CT obtained, expected post-op changes, still with extensive vasogenic edema  No acute events overnight      Assessment:  Lanette Kaufman is a 44 year old female with past medical history significant for uterine leiomyoma and fibroids and recent seizure presents for elective resection of a LEFT frontal mass, presumed meningioma. She underwent a left frontal craniotomy and resection of tumor on 9/16/2024 with Dr. Desai.    She is currently post-operative day 1.    Plan:  - Serial neuro exams  - tylenlol, oxycodone prn  - Continue PTA Keppra 100mg BID  - Continue Dexamethasone at 4q6hr, planning for 7d taper  - Pantoprazole or GI ppx while on steroids  - HOB > 30  - SBP <140  - labetalol/hydralazine prn  - prn antiemetics  - bowel regimen: miralax, senna  - Advance diet as tolerated  - strict I/O  - IVF until adequate PO intake  - Electrolyte replacement protocol    - Hgb > 8  - plts > 100k  - INR < 1.5    - monitor for fever  - SCDs for DVT proph  - PT/OT  - Likely TTF today    -----------------------------------  JEREMY GAVIN MD  PGY2 Neurosurgery  -----------------------------------    Objective:   Temp:  [97.4  F (36.3  C)-99.1  F (37.3  C)] 98.1  F (36.7  C)  Pulse:  [59-68] 66  Resp:  [11-16] 12  BP: (113-165)/(70-98) 119/70  MAP:  [52 mmHg-116 mmHg] 112 mmHg  Arterial Line BP: (123-166)/() 124/102  SpO2:  [98 %-100 %] 98 %  I/O last 3 completed shifts:  In: 2750 [I.V.:2750]  Out: 1815 [Urine:1615; Blood:200]    Gen: Appears comfortable, NAD  Wound: clean, dry, intact; mild LEFT eye swelling  Neurologic:  - Alert & Oriented to person, place, time, and situation  - Follows commands briskly  - Speech fluent, spontaneous. No aphasia or dysarthria  - No gaze preference. No apparent hemineglect  - PERRL, EOMI  - Face sensation intact to light touch, and  activates symmetrically  - Hearing intact to finger rustle bilaterally  - Palate elevates symmetrically with uvula midline  - Tongue protrudes midline    - Trapezii muscles 5/5 bilaterally  - No pronator drift     Del Tr Bi Grp/FE   R 5 5 5 5   L 5 5 5 5    HF KE DF PF   R 5 5 5 5   L 5 5 5 5     Reflexes 2+ throughout  No Rajput's or Clonus, with down-going toes    Sensation intact and symmetric to light touch throughout      LABS:  Recent Labs   Lab 09/16/24  2327 09/16/24 1811 09/16/24  1606 09/16/24 1426 09/16/24 1426 09/16/24  1234   NA  --  137 135  --  133* 137   POTASSIUM  --  3.7 3.7  --  3.2* 3.5   CHLORIDE  --   --   --   --   --  99   CO2  --   --   --   --   --  24   ANIONGAP  --   --   --   --   --  14   * 125* 165*   < > 126* 109*   BUN  --   --   --   --   --  15.2   CR  --   --   --   --   --  0.62   CHI  --   --   --   --   --  9.4    < > = values in this interval not displayed.       Recent Labs   Lab 09/16/24 1811 09/16/24 1426 09/16/24  1234   WBC  --   --  11.1*   RBC  --   --  5.00   HGB 11.4*   < > 13.2   HCT  --   --  40.8   MCV  --   --  82   MCH  --   --  26.4*   MCHC  --   --  32.4   RDW  --   --  14.1   PLT  --   --  216    < > = values in this interval not displayed.       IMAGING:  No results found for this or any previous visit (from the past 24 hour(s)).

## 2024-09-17 NOTE — PLAN OF CARE
Admitted/transferred from: PACU  Reason for admission/transfer: surgical procedure and monitoring  2 RN skin assessment: completed by Snehal BLISS and Ricardo BLISS  Result of skin assessment and interventions/actions: No skin issues  Height, weight, drug calc weight: Done  Patient belongings (see Flowsheet)  MDRO education added to care plan Yes  ?

## 2024-09-17 NOTE — ANESTHESIA CARE TRANSFER NOTE
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    Anesthesia Type:   General     Note:    Oropharynx: oropharynx clear of all foreign objects and spontaneously breathing  Level of Consciousness: awake  Oxygen Supplementation: room air    Independent Airway: airway patency satisfactory and stable  Dentition: dentition unchanged  Vital Signs Stable: post-procedure vital signs reviewed and stable  Report to RN Given: handoff report given  Patient transferred to: PACU  Comments: SBP to be kept <140, 10mg labetalol given in PACU.  Handoff Report: Identifed the Patient, Identified the Reponsible Provider, Reviewed the pertinent medical history, Discussed the surgical course, Reviewed Intra-OP anesthesia mangement and issues during anesthesia, Set expectations for post-procedure period and Allowed opportunity for questions and acknowledgement of understanding      Vitals:  Vitals Value Taken Time   /89    Temp 36.8    Pulse 72    Resp 14    SpO2 100%        Electronically Signed By: LUCA Berad CRNA  September 16, 2024  9:30 PM

## 2024-09-17 NOTE — PLAN OF CARE
4E Defer Physical Therapy - Per chart review and discussion with Occupational Therapy, Pt ambulating with SBA-CGA no AD, steady gait with expected post operative pain, good family support at home. Only one discipline needed to follow for safe home discharge. Plan for OT to continue to follow to address progression of functional independence, stairs, and crani precautions as needed. Pt with no skilled inpatient PT needs, Will complete consult and defer evaluation, please reconsult as appropriate if patient has decline in functional mobility requiring further skilled inpatient PT needs. Defer Discharge recommendations to Occupational Therapy.

## 2024-09-17 NOTE — BRIEF OP NOTE
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]  Post-operative diagnosis Same as pre-operative diagnosis    Procedure: Left frontal craniotomy and resection of tumor, Left - Head    Surgeon: Surgeons and Role:     * Valentin Desai MD - Primary     * Reno Corbett MD - Resident - Assisting     * Rajeev Ramos MD - Resident - Assisting  Anesthesia: General   Estimated Blood Loss: 200 ml    Drains: None  Specimens:   ID Type Source Tests Collected by Time Destination   1 : Left anterior fossa tumor Tissue Brain SURGICAL PATHOLOGY EXAM Valentin Desai MD 9/16/2024  5:59 PM    2 : CUSA contents - left anterior fossa tumor Tissue Brain SURGICAL PATHOLOGY EXAM Valentin Desai MD 9/16/2024  6:00 PM      Findings:   None.  Complications: None.  Implants:   Implant Name Type Inv. Item Serial No.  Lot No. LRB No. Used Action   GRAFT DURAGEN 2X2\"  - SN/A Other GRAFT DURAGEN 2X2\"  N/A [employer] [id number] Left 1 Implanted   IMP SCR SYN MATRIX LOW PRO 1.5X04MM SELF DRILL [id number] - SN/A Metallic Hardware/Kramer IMP SCR SYN MATRIX LOW PRO 1.5X04MM SELF DRILL [id number] N/A SYNTHES-STRATEC N/A Left 10 Implanted   IMP SCR SYN MATRIX LOW PRO 1.5X04MM SELF DRILL [id number] - SN/A Metallic Hardware/Kramer IMP SCR SYN MATRIX LOW PRO 1.5X04MM SELF DRILL [id number] N/A SYNTHES-STRATEC N/A Left 2 Wasted   IMP PLATE SYN SPARKLE HOLE COVER 17MM [id number] - SN/A Metallic Hardware/Kramer IMP PLATE SYN SPARKLE HOLE COVER 17MM [id number] N/A SYNTHES-STRATEC N/A Left 1 Implanted   IMP PLATE SYN MATRIXNEURO STR 2 HOLE 12MM  04.503.062 - SN/A Metallic Hardware/Kramer IMP PLATE SYN MATRIXNEURO STR 2 HOLE 12MM  04.503.062 N/A SYNTHES-STRATEC N/A Left 2 Implanted   IMP PLATE SYN BOX 4 HOLE [id number] 04.503.065 - SN/A Metallic Hardware/Kramer IMP PLATE " SYN BOX 4 HOLE [id number] [phone] N/A SYNTHES-STRATE N/A Left 1 Implanted

## 2024-09-18 ENCOUNTER — APPOINTMENT (OUTPATIENT)
Dept: OCCUPATIONAL THERAPY | Facility: CLINIC | Age: 44
End: 2024-09-18
Attending: NEUROLOGICAL SURGERY
Payer: COMMERCIAL

## 2024-09-18 ENCOUNTER — TELEPHONE (OUTPATIENT)
Dept: NEUROSURGERY | Facility: CLINIC | Age: 44
End: 2024-09-18
Payer: COMMERCIAL

## 2024-09-18 VITALS
WEIGHT: 153.88 LBS | DIASTOLIC BLOOD PRESSURE: 77 MMHG | SYSTOLIC BLOOD PRESSURE: 129 MMHG | TEMPERATURE: 97.9 F | HEIGHT: 65 IN | OXYGEN SATURATION: 99 % | BODY MASS INDEX: 25.64 KG/M2 | RESPIRATION RATE: 18 BRPM | HEART RATE: 79 BPM

## 2024-09-18 LAB
ANION GAP SERPL CALCULATED.3IONS-SCNC: 7 MMOL/L (ref 7–15)
BUN SERPL-MCNC: 6.3 MG/DL (ref 6–20)
CALCIUM SERPL-MCNC: 8 MG/DL (ref 8.8–10.4)
CHLORIDE SERPL-SCNC: 103 MMOL/L (ref 98–107)
CREAT SERPL-MCNC: 0.56 MG/DL (ref 0.51–0.95)
EGFRCR SERPLBLD CKD-EPI 2021: >90 ML/MIN/1.73M2
ERYTHROCYTE [DISTWIDTH] IN BLOOD BY AUTOMATED COUNT: 14.5 % (ref 10–15)
GLUCOSE SERPL-MCNC: 115 MG/DL (ref 70–99)
HCO3 SERPL-SCNC: 28 MMOL/L (ref 22–29)
HCT VFR BLD AUTO: 29.3 % (ref 35–47)
HGB BLD-MCNC: 9.3 G/DL (ref 11.7–15.7)
LACTATE SERPL-SCNC: 1.2 MMOL/L (ref 0.7–2)
MAGNESIUM SERPL-MCNC: 2.3 MG/DL (ref 1.7–2.3)
MCH RBC QN AUTO: 26.7 PG (ref 26.5–33)
MCHC RBC AUTO-ENTMCNC: 31.7 G/DL (ref 31.5–36.5)
MCV RBC AUTO: 84 FL (ref 78–100)
PHOSPHATE SERPL-MCNC: 3.5 MG/DL (ref 2.5–4.5)
PLATELET # BLD AUTO: 238 10E3/UL (ref 150–450)
POTASSIUM SERPL-SCNC: 3.9 MMOL/L (ref 3.4–5.3)
RBC # BLD AUTO: 3.48 10E6/UL (ref 3.8–5.2)
SODIUM SERPL-SCNC: 138 MMOL/L (ref 135–145)
WBC # BLD AUTO: 12.5 10E3/UL (ref 4–11)

## 2024-09-18 PROCEDURE — 999N000248 HC STATISTIC IV INSERT WITH US BY RN

## 2024-09-18 PROCEDURE — 83735 ASSAY OF MAGNESIUM: CPT | Performed by: NEUROLOGICAL SURGERY

## 2024-09-18 PROCEDURE — 250N000013 HC RX MED GY IP 250 OP 250 PS 637

## 2024-09-18 PROCEDURE — 36415 COLL VENOUS BLD VENIPUNCTURE: CPT

## 2024-09-18 PROCEDURE — 83605 ASSAY OF LACTIC ACID: CPT

## 2024-09-18 PROCEDURE — 85027 COMPLETE CBC AUTOMATED: CPT

## 2024-09-18 PROCEDURE — 250N000009 HC RX 250: Performed by: PSYCHIATRY & NEUROLOGY

## 2024-09-18 PROCEDURE — 97535 SELF CARE MNGMENT TRAINING: CPT | Mod: GO

## 2024-09-18 PROCEDURE — 250N000011 HC RX IP 250 OP 636

## 2024-09-18 PROCEDURE — 97530 THERAPEUTIC ACTIVITIES: CPT | Mod: GO

## 2024-09-18 PROCEDURE — 250N000012 HC RX MED GY IP 250 OP 636 PS 637

## 2024-09-18 PROCEDURE — 250N000013 HC RX MED GY IP 250 OP 250 PS 637: Performed by: NEUROLOGICAL SURGERY

## 2024-09-18 PROCEDURE — 84100 ASSAY OF PHOSPHORUS: CPT | Performed by: NEUROLOGICAL SURGERY

## 2024-09-18 PROCEDURE — 80048 BASIC METABOLIC PNL TOTAL CA: CPT

## 2024-09-18 RX ORDER — LANOLIN ALCOHOL/MO/W.PET/CERES
100 CREAM (GRAM) TOPICAL DAILY
Qty: 21 TABLET | Refills: 0 | Status: SHIPPED | OUTPATIENT
Start: 2024-09-18 | End: 2024-10-09

## 2024-09-18 RX ORDER — DEXAMETHASONE 1 MG
TABLET ORAL
Qty: 24 TABLET | Refills: 0 | Status: SHIPPED | OUTPATIENT
Start: 2024-09-18 | End: 2024-09-24

## 2024-09-18 RX ORDER — OXYCODONE HYDROCHLORIDE 5 MG/1
5 TABLET ORAL EVERY 4 HOURS PRN
Qty: 25 TABLET | Refills: 0 | Status: SHIPPED | OUTPATIENT
Start: 2024-09-18

## 2024-09-18 RX ORDER — AMOXICILLIN 250 MG
2 CAPSULE ORAL 2 TIMES DAILY
Status: DISCONTINUED | OUTPATIENT
Start: 2024-09-18 | End: 2024-09-18 | Stop reason: HOSPADM

## 2024-09-18 RX ORDER — PANTOPRAZOLE SODIUM 40 MG/1
40 TABLET, DELAYED RELEASE ORAL
Qty: 14 TABLET | Refills: 0 | Status: SHIPPED | OUTPATIENT
Start: 2024-09-18 | End: 2024-09-25

## 2024-09-18 RX ORDER — POLYETHYLENE GLYCOL 3350 17 G/17G
17 POWDER, FOR SOLUTION ORAL 2 TIMES DAILY
Status: DISCONTINUED | OUTPATIENT
Start: 2024-09-18 | End: 2024-09-18 | Stop reason: HOSPADM

## 2024-09-18 RX ORDER — POLYETHYLENE GLYCOL 3350 17 G/17G
17 POWDER, FOR SOLUTION ORAL DAILY
Qty: 510 G | Refills: 0 | Status: SHIPPED | OUTPATIENT
Start: 2024-09-18

## 2024-09-18 RX ADMIN — POLYETHYLENE GLYCOL 3350 17 G: 17 POWDER, FOR SOLUTION ORAL at 07:28

## 2024-09-18 RX ADMIN — LABETALOL HYDROCHLORIDE 10 MG: 5 INJECTION, SOLUTION INTRAVENOUS at 08:16

## 2024-09-18 RX ADMIN — OXYCODONE HYDROCHLORIDE 5 MG: 5 TABLET ORAL at 03:35

## 2024-09-18 RX ADMIN — LEVETIRACETAM 1000 MG: 500 TABLET, FILM COATED ORAL at 07:28

## 2024-09-18 RX ADMIN — PANTOPRAZOLE SODIUM 40 MG: 40 TABLET, DELAYED RELEASE ORAL at 07:28

## 2024-09-18 RX ADMIN — ACETAMINOPHEN 975 MG: 325 TABLET ORAL at 07:28

## 2024-09-18 RX ADMIN — SENNOSIDES AND DOCUSATE SODIUM 1 TABLET: 8.6; 5 TABLET ORAL at 07:28

## 2024-09-18 RX ADMIN — THIAMINE HYDROCHLORIDE 500 MG: 100 INJECTION, SOLUTION INTRAMUSCULAR; INTRAVENOUS at 07:28

## 2024-09-18 RX ADMIN — DEXAMETHASONE 4 MG: 4 TABLET ORAL at 07:28

## 2024-09-18 RX ADMIN — SODIUM CHLORIDE, SODIUM GLUCONATE, SODIUM ACETATE, POTASSIUM CHLORIDE AND MAGNESIUM CHLORIDE: 526; 502; 368; 37; 30 INJECTION, SOLUTION INTRAVENOUS at 01:18

## 2024-09-18 RX ADMIN — OXYCODONE HYDROCHLORIDE 5 MG: 5 TABLET ORAL at 08:03

## 2024-09-18 ASSESSMENT — ACTIVITIES OF DAILY LIVING (ADL)
ADLS_ACUITY_SCORE: 25

## 2024-09-18 ASSESSMENT — VISUAL ACUITY: OU: OTHER (SEE COMMENT)

## 2024-09-18 NOTE — PROGRESS NOTES
[facility], [place]   09/18/2024  Neurosurgery Progress Note:      Interval History:   New complaint of burning in shins ; hx of heavy alcohol use ; started on thiamine  Post-operative swelling and ecchymoses over LEFT eye  PT/OT cleared for home with assist  No acute events overnight      Assessment:  Lanette Kaufman is a 44 year old female with past medical history significant for uterine leiomyoma and fibroids and recent seizure presents for elective resection of a LEFT frontal mass, presumed meningioma. She underwent a left frontal craniotomy and resection of tumor on 9/16/2024 with Dr. Desai.    She is currently post-operative day 2.    Plan:  - Serial neuro exams  - tylenlol, oxycodone prn  - Continue PTA Keppra 100mg BID  - Dexamethasone 7d taper  - Pantoprazole or GI ppx while on steroids  - HOB > 30  - SBP <140  - labetalol/hydralazine prn  - prn antiemetics  - bowel regimen: miralax, senna  - Advance diet as tolerated  - strict I/O  - IVF until adequate PO intake  - Electrolyte replacement protocol    - Hgb > 8  - plts > 100k  - INR < 1.5    - monitor for fever  - SCDs for DVT proph  - PT/OT -> Home with assist  - Floor status  -----------------------------------  JEREMY GAVIN MD  PGY2 Neurosurgery  -----------------------------------    Objective:   Temp:  [97.5  F (36.4  C)-98.6  F (37  C)] 97.5  F (36.4  C)  Pulse:  [64-82] 66  Resp:  [12-20] 16  BP: (110-140)/(70-89) 132/89  MAP:  [101 mmHg-112 mmHg] 101 mmHg  Arterial Line BP: (107-124)/() 107/96  SpO2:  [97 %-100 %] 100 %  I/O last 3 completed shifts:  In: 6777.25 [P.O.:1915; I.V.:4862.25]  Out: 1425 [Urine:1425]    Gen: Appears comfortable, NAD  Wound: clean, dry, intact; LEFT eye swelling  Neurologic:  - Alert & Oriented to person, place, time, and situation  - Follows commands briskly  - Speech fluent, spontaneous. No aphasia or dysarthria  - No gaze preference. No apparent hemineglect  - PERRL, EOMI  - Face  sensation intact to light touch, and activates symmetrically  - Hearing intact to finger rustle bilaterally  - Palate elevates symmetrically with uvula midline  - Tongue protrudes midline    - Trapezii muscles 5/5 bilaterally  - Subtle RIGHT pronator drift, resolved     Del Tr Bi Grp/FE   R 5 5 5 5   L 5 5 5 5    HF KE DF PF   R 5 5 5 5   L 5 5 5 5     Reflexes 2+ throughout  No Rajput's or Clonus, with down-going toes    Sensation intact and symmetric to light touch throughout      LABS:  Recent Labs   Lab 09/17/24  0652 09/16/24  2327 09/16/24  1811 09/16/24  1606 09/16/24  1426 09/16/24  1234     --  137 135   < > 137   POTASSIUM 3.9  --  3.7 3.7   < > 3.5   CHLORIDE 102  --   --   --   --  99   CO2 21*  --   --   --   --  24   ANIONGAP 14  --   --   --   --  14   * 130* 125* 165*   < > 109*   BUN 11.1  --   --   --   --  15.2   CR 0.48*  --   --   --   --  0.62   CHI 7.6*  --   --   --   --  9.4    < > = values in this interval not displayed.       Recent Labs   Lab 09/17/24  0652   WBC 18.3*   RBC 3.88   HGB 10.3*   HCT 31.8*   MCV 82   MCH 26.5   MCHC 32.4   RDW 14.4          IMAGING:  No results found for this or any previous visit (from the past 24 hour(s)).    I have reviewed the history above and agree with the resident's assessment and plan.  RAHEEM Desai MD

## 2024-09-18 NOTE — PLAN OF CARE
Major Shift Events:  A&Ox4, neuro intact, L face swollen, pt reporting bilateral leg pain - given PRN oxy. NSR 60-80s, afebrile, SBP goal < 140 maintained without intervention. On RA, LS clear. Tolerating regular diet, voiding without problems, no Bm, passing gas. Crani site CDI.     Plan: transfer to floor, continue plan of care      For vital signs and complete assessments, please see documentation flowsheets.       Goal Outcome Evaluation:    Problem: Adult Inpatient Plan of Care  Goal: Absence of Hospital-Acquired Illness or Injury  Intervention: Prevent Infection  Recent Flowsheet Documentation  Taken 9/18/2024 0400 by Whit Montalvo RN  Infection Prevention:   hand hygiene promoted   rest/sleep promoted   single patient room provided  Taken 9/18/2024 0000 by Whit Montalvo RN  Infection Prevention:   hand hygiene promoted   rest/sleep promoted   single patient room provided  Taken 9/17/2024 2000 by Whit Montalvo RN  Infection Prevention:   hand hygiene promoted   rest/sleep promoted   single patient room provided     Problem: Adult Inpatient Plan of Care  Goal: Optimal Comfort and Wellbeing  Intervention: Monitor Pain and Promote Comfort  Recent Flowsheet Documentation  Taken 9/18/2024 0400 by Whit Montalvo RN  Pain Management Interventions:   medication (see MAR)   rest   relaxation techniques promoted  Taken 9/17/2024 2300 by Whit Montalvo RN  Pain Management Interventions: medication (see MAR)  Taken 9/17/2024 2000 by Whit Montalvo RN  Pain Management Interventions:   repositioned   rest     Problem: Adult Inpatient Plan of Care  Goal: Optimal Comfort and Wellbeing  Intervention: Provide Person-Centered Care  Recent Flowsheet Documentation  Taken 9/18/2024 0400 by Whit Montalvo RN  Trust Relationship/Rapport:   care explained   choices provided   emotional support provided   empathic listening provided   questions answered   questions encouraged   reassurance provided   thoughts/feelings  acknowledged  Taken 9/18/2024 0000 by Whit Montalvo RN  Trust Relationship/Rapport:   care explained   choices provided   emotional support provided   empathic listening provided   questions answered   questions encouraged   reassurance provided   thoughts/feelings acknowledged  Taken 9/17/2024 2000 by Whit Montalvo RN  Trust Relationship/Rapport:   care explained   choices provided   emotional support provided   empathic listening provided   questions answered   questions encouraged   reassurance provided   thoughts/feelings acknowledged

## 2024-09-18 NOTE — DISCHARGE SUMMARY
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    Weight as of this encounter: 69.8 kg (153 lb 14.1 oz).           Procedures:   Left sided Craniotomy for Tumor Resection            Brief History of Illness:   Lanette Kaufman is a 44 year old female with a a notable past medical history of an appendectomy in 2002, 2 C-sections, a lymph node biopsy in 2010 and uterine fibroids, who presented to [facility] due to first-time seizure on 9/9/2024.  During evaluation, a moderate-sized homogeneously enhancing mass from the anterior fossa over the left orbital roof was identified with significant edema throughout the left prefrontal region. The tumor appearance was most consistent with a meningioma.  We met with the patient in clinic virtually, and determined that a left frontal craniotomy for resection of the mass was necessary to manage the tumor.  The patient was started on steroids and we assessed her for surgical resection.           Hospital Course:     Postoperatively, the patient was transferred to the ICU for close monitoring.  In the immediate postoperative period, the patient had a lactic acidosis that was significantly elevated, with a lactate of 5.1 at maximum.  The patient has a history of alcohol use (per patient 3 drinks per day).  The patient was closely " "observed and thiamine supplementation was initiated, and lactate were trended frequently.  On postoperative day 2, the lactate levels had normalized.  The patient was evaluated by physical therapy and Occupational Therapy, found to be safe to go home with assist.  The patient that this stay was ambulating, voiding without a Hsu, eating a regular diet and pain was appropriately controlled.  The patient did have some left upper facial edema that is expected for the course of the surgery and was counseled on the progression and alarm symptoms at this time.    A planned follow-up plan was determined, a 2-week wound check to be performed with her PCP for suture removal, a 1 month video visit with Dr. Desai, and a 3-month MRI which can be done locally and the results were reviewed with our neurosurgery team here at the [facility].    Medications that were added to her regimen included a dexamethasone taper, and Protonix for GI prophylaxis and protection during the steroid administration.  The patient was also asked to continue her seizure medication (Keppra) until evaluated by neurology outpatient.    The patient's disability paperwork was signed after discussion with the patient regarding needs.    Exam:   Physical Exam  /77   Pulse 78   Temp 97.9  F (36.6  C) (Axillary)   Resp 18   Ht 1.651 m (5' 5\")   Wt 69.8 kg (153 lb 14.1 oz)   LMP 09/02/2024 (Approximate)   SpO2 98%   BMI 25.61 kg/m    General: Appears comfortable, NAD  Wound: I incision was clean, generally dry, with expected, scarring.  No areas of wound dehiscence or concerning findings.  Neurologic Exam:  - AOx3.  - Follows commands.  - Speech fluent, spontaneous. No aphasia or dysarthria.  - No gaze preference. No apparent hemineglect.  - PERRL, EOMI.  - Face symmetric with sensation intact to light touch.  Expected edema in the left upper face, but able to raise eyebrows symmetrically.  - Palate elevates symmetrically, uvula " midline, tongue protrudes midline.  - Trapezii and sternocleidomastoid muscles 5/5 bilaterally.  - No pronator drift.  Motor: Normal bulk/tone; no tremor, rigidity, or bradykinesia.   Right:  Deltoid 5/5, tricep 5/5, bicep 5/5, wrist flexor 5/5, wrist extensor 5/5, finger intrinsic 5/5  Left:  Deltoid 5/5, tricep 5/5, bicep 5/5, wrist flexor 5/5, wrist extensor 5/5, finger intrinsic 5/5  Right: Iliopsoas 5/5, quadricep 5/5, hamstring 5/5, tibialis anterior 5/5, gastrocnemius 5/5, EHL 5/5  Left:  Iliopsoas 5/5, quadricep 5/5, hamstring 5/5, tibialis anterior 5/5, gastrocnemius 5/5, EHL 5/5    Sensation intact in bilateral L4-S1 dermatones   Normal reflexes, no clonus          Discharge Medications:     Current Discharge Medication List        START taking these medications    Details   oxyCODONE (ROXICODONE) 5 MG tablet Take 1 tablet (5 mg) by mouth every 4 hours as needed for moderate pain.  Qty: 25 tablet, Refills: 0    Associated Diagnoses: Frontal mass of brain      pantoprazole (PROTONIX) 40 MG EC tablet Take 1 tablet (40 mg) by mouth 2 times daily (before meals) for 7 days.  Qty: 14 tablet, Refills: 0    Associated Diagnoses: Frontal mass of brain      polyethylene glycol (MIRALAX) 17 GM/Dose powder Take 17 g by mouth daily.  Qty: 510 g, Refills: 0    Associated Diagnoses: Frontal mass of brain      thiamine (B-1) 100 MG tablet Take 1 tablet (100 mg) by mouth daily for 21 days.  Qty: 21 tablet, Refills: 0    Associated Diagnoses: Frontal mass of brain           CONTINUE these medications which have CHANGED    Details   dexAMETHasone (DECADRON) 1 MG tablet Take 4 tablets (4 mg) by mouth or Feeding Tube every 12 hours for 1 day, THEN 2 tablets (2 mg) every 12 hours for 3 days, THEN 1 tablet (1 mg) every 12 hours for 2 days.  Qty: 24 tablet, Refills: 0    Associated Diagnoses: Frontal mass of brain           CONTINUE these medications which have NOT CHANGED    Details   levETIRAcetam (KEPPRA) 1000 MG tablet Take  1,000 mg by mouth 2 times daily.      omeprazole (PRILOSEC) 40 MG DR capsule Take 40 mg by mouth daily.                Discharge Instructions and Follow-Up:   You underwent surgery to have a brain tumor removed by Valentin Desai MD   - If you have not received the results of the pathology (diagnosis) at the time of discharge, our office will call you with these results as soon as they become available, or we will discuss them with you during your clinic visit, whichever is first.   - If you are on a steroid medication (decadron or dexamethasone) please follow the detailed instructions with the prescription to slowly decrease the amount you are taking.  - You are taking Keppra (levtiracitam) for treatment.  Please follow up in the neurology clinic in 3 months to discuss tapering your seizure medicines. Until that point please continue to take the medications as prescribed..  - You will have follow up scheduled with the physician assistants and/or nurse practitioners in our clinic 2 weeks after your surgery.  If you live far away, you may see your primary care doctor for a wound check at 2 weeks.   - If you have not heard from our clinic about your follow up visit by 3-4 days following your discharge, please call our clinic at [phone] to schedule an appointment with the Neurosurgery teams.     After discharge, your activity restrictions are:   -We encourage short frequent walks, increasing as tolerated.  - No driving until you are seen in clinic and cleared by your neurosurgeon.  If you have had a seizure, you may not drive for at least 3 months according to [place] law.    - No strenuous activity.  - No lifting more than 10 pounds until you are seen in clinic (a gallon of milk weighs approximately 8 pounds)    Wound cares after surgery  - You are ok to shower, but do not soak your incisions. Pat them dry if they get damp.   - Avoid coloring your hair or permanent styling until cleared by your  surgeon  - No baths, hot tubs or pools for 4-6 weeks after surgery.   - No strenuous activity.  - No lifting more than 10 pounds until you are seen in clinic (a gallon of milk weighs approximately 8 pounds)  - You are ok to shower, but do not soak your incisions. Pat them dry if they get damp.   - Avoid coloring your hair or permanent styling until cleared by your surgeon  - No baths, hot tubs or pools for 4-6 weeks after surgery.     Please call if you have:  1. increased pain, redness, drainage, swelling at your incision  2. fevers > 101.5 F degrees  3. with any questions or concerns.  You may reach the Neurosurgery clinic at [phone] during regular work hours. ER at [phone].    and ask for the Neurosurgery Resident on call at [phone], during off hours or weekends.         Discharge Disposition:     Discharged to home        Onofre Hobbs MD  Neurosurgery  Pager: [phone]

## 2024-09-18 NOTE — PLAN OF CARE
Goal Outcome Evaluation:    Discharged to: Home at 1030.  Belongings: clothing, cell phone,   AVS (After Visit Summary) discussed with: patient and family

## 2024-09-19 ENCOUNTER — TELEPHONE (OUTPATIENT)
Dept: NEUROSURGERY | Facility: CLINIC | Age: 44
End: 2024-09-19
Payer: COMMERCIAL

## 2024-09-20 ENCOUNTER — TELEPHONE (OUTPATIENT)
Dept: NEUROSURGERY | Facility: CLINIC | Age: 44
End: 2024-09-20
Payer: COMMERCIAL

## 2024-09-20 DIAGNOSIS — D32.0 BENIGN NEOPLASM OF CEREBRAL MENINGES (H): Primary | ICD-10-CM

## 2024-09-20 LAB
INTERPRETATION: NORMAL
LAB DIRECTOR COMMENTS: NORMAL
LAB DIRECTOR DISCLAIMER: NORMAL
LAB DIRECTOR INTERPRETATION: NORMAL
LAB DIRECTOR METHODOLOGY: NORMAL
LAB DIRECTOR RESULTS: NORMAL
PATH REPORT.COMMENTS IMP SPEC: ABNORMAL
PATH REPORT.COMMENTS IMP SPEC: YES
PATH REPORT.FINAL DX SPEC: ABNORMAL
PATH REPORT.GROSS SPEC: ABNORMAL
PATH REPORT.MICROSCOPIC SPEC OTHER STN: ABNORMAL
PATH REPORT.RELEVANT HX SPEC: ABNORMAL
PHOTO IMAGE: ABNORMAL
SIGNIFICANT RESULTS: NORMAL
SPECIMEN DESCRIPTION: NORMAL
SPECIMEN DESCRIPTION: NORMAL
TEST DETAILS, MDL: NORMAL

## 2024-09-20 RX ORDER — LEVETIRACETAM 250 MG/1
250 TABLET ORAL 2 TIMES DAILY
Qty: 60 TABLET | Refills: 1 | Status: SHIPPED | OUTPATIENT
Start: 2024-09-20

## 2024-09-20 NOTE — TELEPHONE ENCOUNTER
Spoke with patient , denies any Auras today.  Per Dr Desai increase to Keppra 1250mg twice daily for any auras restarting.    Keppra 250mg tablet sent to pharmacy, patient on 1000mg twice daily now.    Voices understanding.

## 2024-09-20 NOTE — TELEPHONE ENCOUNTER
Callback to Lanette.  Voicemail, left message Dr Desai does want to increase Keppra to 1250 mg BID.  Please call Lanette  [phone]  Thanks

## 2024-09-20 NOTE — TELEPHONE ENCOUNTER
Spoke with patient.  Patient is having an aura type  issue, vision gets slightly blurry for about 20 minutes, take Tylenol and it goes away.  This is happened 3x in the past 24 hours.  Patient states this has happened in the past prior to a migraine type headache.  Denies any headache or severe head pains.   Discharged on 9/18 from Craniotomy and resection of frontal mass.  No vision issues post op until 24 hours ago, no incisional pain, no dizziness or fogginess, cognitively feels the same.  Talking alert, answers questions quickly, denies any facial droop or new swelling, denies any double vision, states face looks the same  Patient has H/O of migraines with Auras but at this time no headache with aura feeling. No other issues. Eating and drinking and staying hydrated.      Will report to Provider and callback for any directives. Patient will callback for any change of symptoms, worsening or new symptoms.

## 2024-09-23 ENCOUNTER — LAB REQUISITION (OUTPATIENT)
Dept: LAB | Facility: CLINIC | Age: 44
End: 2024-09-23
Payer: COMMERCIAL

## 2024-09-23 ENCOUNTER — TELEPHONE (OUTPATIENT)
Dept: NEUROSURGERY | Facility: CLINIC | Age: 44
End: 2024-09-23

## 2024-09-23 LAB
Lab: NORMAL
PERFORMING LABORATORY: NORMAL
SPECIMEN STATUS: NORMAL
TEST NAME: NORMAL

## 2024-09-23 PROCEDURE — G0452 MOLECULAR PATHOLOGY INTERPR: HCPCS | Mod: 26 | Performed by: PATHOLOGY

## 2024-09-23 PROCEDURE — 81277 CYTOGENOMIC NEO MICRORA ALYS: CPT | Performed by: STUDENT IN AN ORGANIZED HEALTH CARE EDUCATION/TRAINING PROGRAM

## 2024-09-23 PROCEDURE — 81455 SO/HL 51/>GSAP DNA/DNA&RNA: CPT | Performed by: NEUROLOGICAL SURGERY

## 2024-09-23 NOTE — TELEPHONE ENCOUNTER
[facility] Call Center    Phone Message    May a detailed message be left on voicemail: yes     Reason for Call: Other: Patient called stating she wants to complete 10/4/2024 post-op at [facility] in [place], [place]. She states they require orders to be sent but she does not have the fax number or contact information. She would also like the MRI order to be sent to the same place. Please call patient to discuss.     Action Taken: Message routed to:  [facility] Neurosurgery    Travel Screening: Not Applicable     Date of Service:

## 2024-09-24 NOTE — TELEPHONE ENCOUNTER
Provider Dr Lara Moncada PCP   Patrice Bhatia Phone   Fax   9/16 Left Crainotomy with Resection of Tumor.  Discharged 9/18/24    Summary of care document sent to LEAH WEBSTER  Sent 9/18/2024 by Valentin Desai MD    Spoke with patient, patient had 2 episodes on Sunday of previous reported having 2 Aura feelings on Sunday and one on Monday.  Patient states increased Keppra to 1250 BID on Sunday.      Outbound call to Dr Webster office for post op appointment for suture removal. Appointment set with a NP on 10/1 @ 1000 for check in .  Callback to patient given information      Patient asked to callback for any future aura feelings as Keppra increased to 1250 BID per Dr Desai from 100mg bid.    Voices understanding.

## 2024-09-25 ENCOUNTER — TELEPHONE (OUTPATIENT)
Dept: NEUROLOGY | Facility: CLINIC | Age: 44
End: 2024-09-25
Payer: COMMERCIAL

## 2024-09-25 NOTE — TELEPHONE ENCOUNTER
Brief Neurosurgery Telephone Note    Lanette Kaufman is a 44 year old female with past medical history significant for uterine leiomyoma and fibroids and recent seizure underwent an elective resection of a LEFT frontal meningioma with Dr. Desai on 9/16/24.     This evening, she calls with one day of generalized weakness and aching in her knees and ankles where she has difficulty standing due to pain, not managed by tylenol and oxycodone. She also noticed bruising on left forearm and left thigh. She denies redness or swelling of her extremities. She denies focal weakness, only fatigue. She denies numbness/tingling, paraesthesias, or any additional concerns for seizures.    She denies fever or chills, or any concerns with her surgical site, which she describes as non-tender and without signs of drainage.     She endorses feeling like she has a bladder infection that started yesterday as well due to burning with urination and urgency. She denies other constitutional symptoms including chest pain, shortness of breath, abdominal pain.    Her present medications include Keppra, protonix, and thiamine. She completed course of steroids yesterday.      I discussed that her presentation may be related to discontinuing her steroids - she was on a 1 week taper, and can be managed symptomatically. Her urinary symptoms independently suggest she may have a urinary tract infection, which should be managed independently to reduce her risk of infection, which may be higher due to her recent use of steroids. We discussed that she should present to a nearby urgent care for additional treatment of her UTI and options for symptomatic management, but she reported that her nearby facilities were closed. We discussed using ibuprofen conservatively to manage her joint pain before the morning before going to the urgent care first thing, with the understanding that it is generally avoided due to concerns for wound healing. She was  agreeable to the plan. She also understands she is more than welcome to come to our ED or the nearest ED for further evaluation.      JEREMY GAVIN MD  PGY2 Neurosurgery

## 2024-09-26 ENCOUNTER — TELEPHONE (OUTPATIENT)
Dept: NEUROSURGERY | Facility: CLINIC | Age: 44
End: 2024-09-26
Payer: COMMERCIAL

## 2024-09-26 NOTE — TELEPHONE ENCOUNTER
"Outbound call to patient.  Per Dr Desai  \" Her Pathology showed meningioma benign tumor.  The pathologist is doing some additional studies to subtype it.  But the diagnosis is what we thought\".    Excellent results, patient happy with results.    Patient did have HA for about one hour yesterday with some ocular issue, last one hour and resolved with Tylenol 1000mg .    Note sent to Dr Desai with information.       "

## 2024-10-01 ENCOUNTER — TELEPHONE (OUTPATIENT)
Dept: NEUROSURGERY | Facility: CLINIC | Age: 44
End: 2024-10-01
Payer: COMMERCIAL

## 2024-10-01 ENCOUNTER — DOCUMENTATION ONLY (OUTPATIENT)
Dept: NEUROSURGERY | Facility: CLINIC | Age: 44
End: 2024-10-01
Payer: COMMERCIAL

## 2024-10-01 NOTE — TELEPHONE ENCOUNTER
[facility] Call Center    Phone Message    May a detailed message be left on voicemail: yes     Reason for Call: Other: Francoise is calling from [place] and is needing clarification  on paperwork Francoise is also going to send a fax on what need clarified.      Action Taken: Message routed to:  [facility] Neurosurgery    Travel Screening: Not Applicable     Date of Service:

## 2024-10-01 NOTE — PROGRESS NOTES
[facility]  & [facility] rehabilitation services paperwork was filled out signed faxed to [phone] also sent to be scane in pt chart    [facility]

## 2024-10-01 NOTE — TELEPHONE ENCOUNTER
[employer] For completed.  STD claim explanation form completed.  Ernesto PALACIOS 10/1/24    Given to Navigator to return by Fax.

## 2024-10-03 LAB — MAYO MISC RESULT: NORMAL

## 2024-10-10 DIAGNOSIS — D32.0 BENIGN NEOPLASM OF CEREBRAL MENINGES (H): ICD-10-CM

## 2024-10-10 NOTE — TELEPHONE ENCOUNTER
Patient calling for refill of Keppra 1000mg tablets.  She is currently taking 1250mg twice daily using a 1000mg tablet + a 250mg tablet.  Next appointment with Dr Desai 10/16/24. Medication filled for 90 days.

## 2024-10-11 ENCOUNTER — MYC REFILL (OUTPATIENT)
Dept: NEUROSURGERY | Facility: CLINIC | Age: 44
End: 2024-10-11
Payer: COMMERCIAL

## 2024-10-11 DIAGNOSIS — D32.0 BENIGN NEOPLASM OF CEREBRAL MENINGES (H): ICD-10-CM

## 2024-10-11 RX ORDER — LEVETIRACETAM 1000 MG/1
1000 TABLET ORAL 2 TIMES DAILY
Qty: 180 TABLET | Refills: 0 | Status: SHIPPED | OUTPATIENT
Start: 2024-10-11 | End: 2024-10-11

## 2024-10-11 RX ORDER — LEVETIRACETAM 250 MG/1
250 TABLET ORAL 2 TIMES DAILY
Qty: 180 TABLET | Refills: 0 | Status: SHIPPED | OUTPATIENT
Start: 2024-10-11 | End: 2024-10-11

## 2024-10-14 RX ORDER — LEVETIRACETAM 1000 MG/1
1000 TABLET ORAL 2 TIMES DAILY
Qty: 180 TABLET | Refills: 0 | Status: SHIPPED | OUTPATIENT
Start: 2024-10-14

## 2024-10-14 RX ORDER — LEVETIRACETAM 250 MG/1
250 TABLET ORAL 2 TIMES DAILY
Qty: 180 TABLET | Refills: 0 | Status: SHIPPED | OUTPATIENT
Start: 2024-10-14

## 2024-10-25 ENCOUNTER — DOCUMENTATION ONLY (OUTPATIENT)
Dept: NEUROSURGERY | Facility: CLINIC | Age: 44
End: 2024-10-25
Payer: COMMERCIAL

## 2024-10-29 ENCOUNTER — DOCUMENTATION ONLY (OUTPATIENT)
Dept: NEUROSURGERY | Facility: CLINIC | Age: 44
End: 2024-10-29
Payer: COMMERCIAL

## 2024-10-29 NOTE — PROGRESS NOTES
Medical accommodation request form was fax to [phone] and sent to be scanned in pt chart.      [facility] neurosurgery

## 2024-12-18 ENCOUNTER — DOCUMENTATION ONLY (OUTPATIENT)
Dept: NEUROSURGERY | Facility: CLINIC | Age: 44
End: 2024-12-18
Payer: COMMERCIAL

## 2024-12-18 NOTE — CONFIDENTIAL NOTE
Referral  DOS 11/22/24 was faxed to [facility] in [place] to [phone] spoke with pt stating the referral was faxed over    [facility] neurosurgery

## 2025-01-20 ENCOUNTER — CARE COORDINATION (OUTPATIENT)
Dept: NEUROSURGERY | Facility: CLINIC | Age: 45
End: 2025-01-20
Payer: COMMERCIAL

## 2025-01-20 NOTE — PROGRESS NOTES
Writer obtained MR Brain 12/18/2024 from [facility], had resolved to [facility], verified reports in Care Everywhere.

## 2025-01-21 ENCOUNTER — VIRTUAL VISIT (OUTPATIENT)
Dept: NEUROSURGERY | Facility: CLINIC | Age: 45
End: 2025-01-21
Payer: COMMERCIAL

## 2025-01-21 DIAGNOSIS — D32.0 BENIGN NEOPLASM OF CEREBRAL MENINGES (H): ICD-10-CM

## 2025-01-21 DIAGNOSIS — D32.0 CEREBRAL MENINGIOMA (H): Primary | ICD-10-CM

## 2025-01-21 NOTE — NURSING NOTE
Current patient location: [address] [place] [id number]    Is the patient currently in the state of [place]? YES    Visit mode: TELEPHONE    If the visit is dropped, the patient can be reconnected by:TELEPHONE VISIT: Phone number:   Telephone Information:   Mobile [phone]       Will anyone else be joining the visit? NO  (If patient encounters technical issues they should call [phone])    Are changes needed to the allergy or medication list? Pt stated no med changes    Are refills needed on medications prescribed by this physician? NO    Rooming Documentation:  Questionnaire(s) completed    Reason for visit: RECHECK (3 month follow-up )    Lauren WAITE

## 2025-01-21 NOTE — LETTER
1/21/2025       RE: Lanette Kaufman  [address] [address]     Dear Colleague,    Thank you for referring your patient, Lanette Kaufman, to the [facility]UROSURGERY [facility]. Please see a copy of my visit note below.    Virtual Visit Details  Date of service: 1/21/2025  Type of service:  Telephone Visit   Phone call duration: 15 minutes   Originating Location (pt. Location): Home    Distant Location (provider location):  Off-site  Telephone visit completed due to the patient did not have access to video, while the distant provider did.      Shelbi Bermudez MD   [address] [place] [address]     Dear Dr. Bermudez:    We spoke to Mrs. Kaufman as part of a telephone follow-up in cranial neurosurgery clinic today.  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  She continues to recover well.  She is back to working full-time.  She is not having any severe headaches and has not had any obvious seizures.  The Keppra dosage is 1250 mg twice daily.    Over the phone, she sounded well.  Her speech, language, and phonation are normal.    We reviewed her MRI from 12/18/2024 and compared it to the preoperative studies.  There is no obvious residual or recurrent tumor.  The edema in the left frontal lobe has resolved but there are gliotic changes surrounding the resection site.  There is a very small subdural hygroma over the left frontal convexity.  Overall, the imaging looks favorable.    She will be seeing Dr. Morris in neurology in a few months regarding the duration of antiepileptic therapy.  She is not having the paresthesias anymore.  For simplicity, we shall reduce the Keppra dosing to 1000 mg twice daily, and she will remain at this dose until she sees Dr. Morris.  We will repeat an MRI in about 1 year at [facility].  Of note, she may start hormone  replacement therapy in the near future.  We asked her to notify us if she does so as we will repeat an MRI in 6 months rather than 1 year.  Please do not hesitate to contact us with questions.    Sincerely,        Valentin Desai MD  Department of Neurosurgery      Again, thank you for allowing me to participate in the care of your patient.      Sincerely,    Valentin Desai MD

## 2025-01-21 NOTE — PROGRESS NOTES
Virtual Visit Details  Date of service: 1/21/2025  Type of service:  Telephone Visit   Phone call duration: 15 minutes   Originating Location (pt. Location): Home    Distant Location (provider location):  Off-site  Telephone visit completed due to the patient did not have access to video, while the distant provider did.      Shelbi Bermudez MD   [address] [place] [address]     Dear Dr. Bermudez:    We spoke to Mrs. Kaufman as part of a telephone follow-up in cranial neurosurgery clinic today.  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  She continues to recover well.  She is back to working full-time.  She is not having any severe headaches and has not had any obvious seizures.  The Keppra dosage is 1250 mg twice daily.    Over the phone, she sounded well.  Her speech, language, and phonation are normal.    We reviewed her MRI from 12/18/2024 and compared it to the preoperative studies.  There is no obvious residual or recurrent tumor.  The edema in the left frontal lobe has resolved but there are gliotic changes surrounding the resection site.  There is a very small subdural hygroma over the left frontal convexity.  Overall, the imaging looks favorable.    She will be seeing Dr. Morris in neurology in a few months regarding the duration of antiepileptic therapy.  She is not having the paresthesias anymore.  For simplicity, we shall reduce the Keppra dosing to 1000 mg twice daily, and she will remain at this dose until she sees Dr. Morris.  We will repeat an MRI in about 1 year at [facility].  Of note, she may start hormone replacement therapy in the near future.  We asked her to notify us if she does so as we will repeat an MRI in 6 months rather than 1 year.  Please do not hesitate to contact us with questions.    Sincerely,        Valentin Desai MD  Department of Neurosurgery

## 2025-01-21 NOTE — PATIENT INSTRUCTIONS
Repeat MRI brain with contrast in December 2025 at [facility] (ordered)    Reduce Keppra dose to 1000 mg bid until you see Dr. Morris in neurology - notify us if you develop any new symptoms after reducing the dose    Notify us if you start hormone therapy as we will repeat an MRI In 6 months rather than one year

## 2025-07-23 ENCOUNTER — DOCUMENTATION ONLY (OUTPATIENT)
Dept: NEUROSURGERY | Facility: CLINIC | Age: 45
End: 2025-07-23
Payer: COMMERCIAL

## 2025-07-23 NOTE — PROGRESS NOTES
Neuroscience Clinic Task Note    TASK    Fax/Scan  Document MRI   Destination [facility]   Date/time sent 7/23/2025 at 2:34 PM        FOLLOW-UP      ADDITIONAL COMMENTS  Order was fax to [phone]    Gary Limon CMA

## (undated) DEVICE — PREP POVIDONE-IODINE 7.5% SCRUB 4OZ BOTTLE MDS093945

## (undated) DEVICE — ESU GROUND PAD ADULT W/CORD E7507

## (undated) DEVICE — SPONGE COTTONOID 1/2X1/2" 20-04S

## (undated) DEVICE — PREP POVIDONE-IODINE 10% SOLUTION 4OZ BOTTLE MDS093944

## (undated) DEVICE — SYR 30ML LL W/O NDL 302832

## (undated) DEVICE — TUBING SMOKE EVAC 3/8"X10' 0702-045-023

## (undated) DEVICE — LABEL MEDICATION SYSTEM 3303-P

## (undated) DEVICE — PREP SKIN SCRUB TRAY 4461A

## (undated) DEVICE — BLADE KNIFE BEAVER MICROSHARP GREEN 377515

## (undated) DEVICE — BONE WAX 2.5GM W31G

## (undated) DEVICE — PAD CHUX UNDERPAD 23X24" 7136

## (undated) DEVICE — CUSA TUBE QUICK CONNECT CLARITY C7300

## (undated) DEVICE — SPONGE COTTONOID 1/2X1 1/2" 20-06S

## (undated) DEVICE — BUR STRK DIAMOND NEURO MATCH HEAD 3.0MM 5820-107-130

## (undated) DEVICE — SU VICRYL 2-0 CT-2 CR 8X18" J726D

## (undated) DEVICE — SU ETHILON 3-0 PS-1 18" 1663H

## (undated) DEVICE — SPONGE COTTONOID 1X3" 20-10S

## (undated) DEVICE — CLIP RANEY

## (undated) DEVICE — LINEN TOWEL PACK X6 WHITE 5487

## (undated) DEVICE — SURGICEL HEMOSTAT 4X8" 1952

## (undated) DEVICE — ESU FCP CODMAN 9"X1.0MM VERSATRU 9009100SL

## (undated) DEVICE — DRAPE POUCH INSTRUMENT 1018

## (undated) DEVICE — DRAPE CRANIOTOMY W/POUCH 9450

## (undated) DEVICE — WIPES FOLEY CARE SURESTEP PROVON DFC100

## (undated) DEVICE — SPONGE COTTONOID 1/4X1/4" 20-01S

## (undated) DEVICE — IMP SCR SYN MATRIX LOW PRO 1.5X04MM SELF DRILL 04.503.104.01: Type: IMPLANTABLE DEVICE | Site: CRANIAL | Status: NON-FUNCTIONAL

## (undated) DEVICE — DRAPE SHEET MED 44X70" 9355

## (undated) DEVICE — STRAP UNIVERSAL POSITIONING 2-PIECE 4X47X76" 91-287

## (undated) DEVICE — SYR 10ML FINGER CONTROL W/O NDL 309695

## (undated) DEVICE — GLOVE BIOGEL PI MICRO SZ 7.0 48570

## (undated) DEVICE — DRSG KERLIX 4 1/2"X4YDS ROLL 6715

## (undated) DEVICE — DRAPE CORETEMP FLUID WARM SYSTEM 62X56IN CTD200

## (undated) DEVICE — SUCTION MANIFOLD NEPTUNE 2 SYS 4 PORT 0702-020-000

## (undated) DEVICE — PACK GOWN 3/PK DISP XL SBA32GPFCB

## (undated) DEVICE — SOL RINGERS LACTATED 1000ML BAG 07953-09

## (undated) DEVICE — BUR STRK CARBIDE ROUND 5.0MM 5820-110-050C

## (undated) DEVICE — OINTMENT ANTIBIOTIC BACITRACIN ZINC .9 G 1171

## (undated) DEVICE — PACK CRANIOTOMY

## (undated) DEVICE — CUSA 36KHZ TIP CVD EXT MICROTIP CLARITY C74115

## (undated) DEVICE — SYR 10ML LL W/O NDL 302995

## (undated) DEVICE — SPONGE SURGIFOAM 01GM POWDER 1978

## (undated) DEVICE — SPONGE COTTONOID 1/2X3" 20-07S

## (undated) DEVICE — DECANTER BAG 2002S

## (undated) DEVICE — RETR ELASTIC STAYS LONE STAR BLUNT DUAL LEAD 3550-1G

## (undated) DEVICE — PIN SKULL MAYFIELD ADULT TITANIUM 3/PK A1120

## (undated) DEVICE — BUR STRK 2.3MM TAPERED ROUTER - FA2 5407-FA2-023

## (undated) DEVICE — SU NUROLON 4-0 TF CR 8X18" C584D

## (undated) DEVICE — NDL ANGIOCATH 14GA 1.25" 4048

## (undated) DEVICE — CATH TRAY FOLEY SURESTEP 16FR W/URNE MTR STLK LATEX A303316A

## (undated) DEVICE — LINEN TOWEL PACK X30 5481

## (undated) DEVICE — DRAPE MICROSCOPE LEICA 54X120" 09-MK653

## (undated) DEVICE — CUSA 36KHZ WRENCH TORQUE CLARITY C7602

## (undated) DEVICE — ESU CORD BIPOLAR AND IRR TUBING AESCULAP US355

## (undated) DEVICE — SPONGE SURGIFOAM 100 1974

## (undated) RX ORDER — FENTANYL CITRATE-0.9 % NACL/PF 10 MCG/ML
PLASTIC BAG, INJECTION (ML) INTRAVENOUS
Status: DISPENSED
Start: 2024-09-16

## (undated) RX ORDER — FENTANYL CITRATE 50 UG/ML
INJECTION, SOLUTION INTRAMUSCULAR; INTRAVENOUS
Status: DISPENSED
Start: 2024-09-16

## (undated) RX ORDER — ONDANSETRON 2 MG/ML
INJECTION INTRAMUSCULAR; INTRAVENOUS
Status: DISPENSED
Start: 2024-09-16

## (undated) RX ORDER — CEFAZOLIN SODIUM 1 G/3ML
INJECTION, POWDER, FOR SOLUTION INTRAMUSCULAR; INTRAVENOUS
Status: DISPENSED
Start: 2024-09-16

## (undated) RX ORDER — PROPOFOL 10 MG/ML
INJECTION, EMULSION INTRAVENOUS
Status: DISPENSED
Start: 2024-09-16

## (undated) RX ORDER — LABETALOL HYDROCHLORIDE 5 MG/ML
INJECTION, SOLUTION INTRAVENOUS
Status: DISPENSED
Start: 2024-09-16

## (undated) RX ORDER — HYDROMORPHONE HYDROCHLORIDE 1 MG/ML
INJECTION, SOLUTION INTRAMUSCULAR; INTRAVENOUS; SUBCUTANEOUS
Status: DISPENSED
Start: 2024-09-16

## (undated) RX ORDER — ACETAMINOPHEN 325 MG/1
TABLET ORAL
Status: DISPENSED
Start: 2024-09-16

## (undated) RX ORDER — CEFAZOLIN SODIUM/WATER 2 G/20 ML
SYRINGE (ML) INTRAVENOUS
Status: DISPENSED
Start: 2024-09-16

## (undated) RX ORDER — DEXAMETHASONE SODIUM PHOSPHATE 4 MG/ML
INJECTION, SOLUTION INTRA-ARTICULAR; INTRALESIONAL; INTRAMUSCULAR; INTRAVENOUS; SOFT TISSUE
Status: DISPENSED
Start: 2024-09-16

## (undated) RX ORDER — SODIUM CHLORIDE 9 MG/ML
INJECTION, SOLUTION INTRAVENOUS
Status: DISPENSED
Start: 2024-09-16

## (undated) RX ORDER — MANNITOL 20 G/100ML
INJECTION, SOLUTION INTRAVENOUS
Status: DISPENSED
Start: 2024-09-16

## (undated) RX ORDER — BUPIVACAINE HYDROCHLORIDE AND EPINEPHRINE 2.5; 5 MG/ML; UG/ML
INJECTION, SOLUTION EPIDURAL; INFILTRATION; INTRACAUDAL; PERINEURAL
Status: DISPENSED
Start: 2024-09-16